# Patient Record
Sex: MALE | Race: WHITE | RURAL
[De-identification: names, ages, dates, MRNs, and addresses within clinical notes are randomized per-mention and may not be internally consistent; named-entity substitution may affect disease eponyms.]

---

## 2022-04-01 ENCOUNTER — OFFICE VISIT (OUTPATIENT)
Dept: FAMILY MEDICINE CLINIC | Age: 47
End: 2022-04-01
Payer: COMMERCIAL

## 2022-04-01 VITALS
SYSTOLIC BLOOD PRESSURE: 136 MMHG | RESPIRATION RATE: 16 BRPM | TEMPERATURE: 97.2 F | BODY MASS INDEX: 40.43 KG/M2 | OXYGEN SATURATION: 98 % | DIASTOLIC BLOOD PRESSURE: 86 MMHG | WEIGHT: 315 LBS | HEART RATE: 60 BPM | HEIGHT: 74 IN

## 2022-04-01 DIAGNOSIS — Z11.59 ENCOUNTER FOR HEPATITIS C SCREENING TEST FOR LOW RISK PATIENT: ICD-10-CM

## 2022-04-01 DIAGNOSIS — F33.1 MODERATE EPISODE OF RECURRENT MAJOR DEPRESSIVE DISORDER (HCC): ICD-10-CM

## 2022-04-01 DIAGNOSIS — Z12.5 SCREENING FOR PROSTATE CANCER: ICD-10-CM

## 2022-04-01 DIAGNOSIS — Z13.220 SCREENING FOR CHOLESTEROL LEVEL: ICD-10-CM

## 2022-04-01 DIAGNOSIS — R03.0 ELEVATED BP WITHOUT DIAGNOSIS OF HYPERTENSION: ICD-10-CM

## 2022-04-01 DIAGNOSIS — Z13.29 SCREENING FOR THYROID DISORDER: ICD-10-CM

## 2022-04-01 DIAGNOSIS — R06.83 SNORING: ICD-10-CM

## 2022-04-01 DIAGNOSIS — Z00.00 PHYSICAL EXAM: Primary | ICD-10-CM

## 2022-04-01 DIAGNOSIS — Z76.89 ESTABLISHING CARE WITH NEW DOCTOR, ENCOUNTER FOR: ICD-10-CM

## 2022-04-01 PROCEDURE — 99386 PREV VISIT NEW AGE 40-64: CPT | Performed by: NURSE PRACTITIONER

## 2022-04-01 NOTE — PROGRESS NOTES
Identified pt with two pt identifiers(name and ). Chief Complaint   Patient presents with    New Patient    Labs    Depression        Health Maintenance Due   Topic    Hepatitis C Screening     Depression Screen     COVID-19 Vaccine (1)    DTaP/Tdap/Td series (1 - Tdap)    Lipid Screen     Colorectal Cancer Screening Combo        Wt Readings from Last 3 Encounters:   22 317 lb (143.8 kg)     Temp Readings from Last 3 Encounters:   22 97.2 °F (36.2 °C) (Temporal)     BP Readings from Last 3 Encounters:   22 136/86     Pulse Readings from Last 3 Encounters:   22 60         Learning Assessment:  :     No flowsheet data found. Depression Screening:  :     3 most recent PHQ Screens 2022   Little interest or pleasure in doing things Not at all   Feeling down, depressed, irritable, or hopeless Not at all   Total Score PHQ 2 0       Fall Risk Assessment:  :     No flowsheet data found. Abuse Screening:  :     Abuse Screening Questionnaire 2022   Do you ever feel afraid of your partner? N   Are you in a relationship with someone who physically or mentally threatens you? N   Is it safe for you to go home? Y       Coordination of Care Questionnaire:  :     1) Have you been to an emergency room, urgent care clinic since your last visit? no   Hospitalized since your last visit? no             2) Have you seen or consulted any other health care providers outside of [address] [place] [address] since your last visit? [facility]  (Include any pap smears or colon screenings in this section.)    3) Do you have an Advance Directive on file? no  Are you interested in receiving information about Advance Directives? no    Patient is accompanied by N/A I have received verbal consent from Elaine Vidal to discuss any/all medical information while they are present in the room. 4.  For patients aged 39-70: Has the patient had a colonoscopy / FIT/ Cologuard?  No      If the patient is female:    5. For patients aged 41-77: Has the patient had a mammogram within the past 2 years? NA - based on age or sex      10. For patients aged 21-65: Has the patient had a pap smear?  NA - based on age or sex

## 2022-04-01 NOTE — PROGRESS NOTES
HISTORY OF PRESENT ILLNESS  Marty Jon is a 49 y.o. male. HPI  Pt presents as a new patient    He wanted to establish care, has not seen PCP in years  He is ready to take a long term approach to taking care of himself  Has not had routine check ups and labs    He notes that his wife is certain that he has sleep apnea. He snores \"like a freight train\" and never feels rested, even after sleeping. He has never had sleep study in the past.  Review of Systems   Constitutional: Negative for fever. Physical Exam  Constitutional:       Appearance: Normal appearance. HENT:      Head: Normocephalic and atraumatic. Right Ear: Tympanic membrane normal.      Left Ear: Tympanic membrane normal.      Nose: Nose normal.      Mouth/Throat:      Mouth: Mucous membranes are moist.   Eyes:      Extraocular Movements: Extraocular movements intact. Conjunctiva/sclera: Conjunctivae normal.      Pupils: Pupils are equal, round, and reactive to light. Cardiovascular:      Rate and Rhythm: Normal rate and regular rhythm. Heart sounds: Normal heart sounds. Pulmonary:      Effort: Pulmonary effort is normal.      Breath sounds: Normal breath sounds. Abdominal:      General: Abdomen is flat. Bowel sounds are normal.      Palpations: Abdomen is soft. Musculoskeletal:         General: Normal range of motion. Cervical back: Normal range of motion and neck supple. Skin:     General: Skin is warm and dry. Neurological:      General: No focal deficit present. Mental Status: He is alert and oriented to person, place, and time. Psychiatric:         Mood and Affect: Mood normal.         Behavior: Behavior normal.         ASSESSMENT and PLAN    ICD-10-CM ICD-9-CM    1.  Physical exam  Z00.00 V70.9 CBC W/O DIFF      METABOLIC PANEL, COMPREHENSIVE      LIPID PANEL      HEPATITIS C AB      THYROID CASCADE PROFILE      PSA SCREENING (SCREENING)      PSA SCREENING (SCREENING)      THYROID CASCADE PROFILE HEPATITIS C AB      LIPID PANEL      METABOLIC PANEL, COMPREHENSIVE      CBC W/O DIFF   2. Establishing care with new doctor, encounter for  Z76.89 V65.8 CBC W/O DIFF      METABOLIC PANEL, COMPREHENSIVE      LIPID PANEL      HEPATITIS C AB      THYROID CASCADE PROFILE      PSA SCREENING (SCREENING)      PSA SCREENING (SCREENING)      THYROID CASCADE PROFILE      HEPATITIS C AB      LIPID PANEL      METABOLIC PANEL, COMPREHENSIVE      CBC W/O DIFF   3. Screening for cholesterol level  Z13.220 V77.91 LIPID PANEL      LIPID PANEL   4. Screening for thyroid disorder  Z13.29 V77.0 THYROID CASCADE PROFILE      THYROID CASCADE PROFILE   5. Screening for prostate cancer  Z12.5 V76.44 PSA SCREENING (SCREENING)      PSA SCREENING (SCREENING)   6. Encounter for hepatitis C screening test for low risk patient  Z11.59 V73.89 HEPATITIS C AB      HEPATITIS C AB   7. Moderate episode of recurrent major depressive disorder (HCC)  F33.1 296.32    8. Snoring  R06.83 786.09 SLEEP MEDICINE REFERRAL   9. Elevated BP without diagnosis of hypertension  R03.0 796.2 SLEEP MEDICINE REFERRAL     Will notify when labs return, and inform him of any change in plan of care at that time    Educated about calling sleep center for an appointment today    Pt informed to return to office with worsening of symptoms, or PRN with any questions or concerns. Pt verbalizes understanding of plan of care and denies further questions or concerns at this time.

## 2022-04-02 LAB
ALBUMIN SERPL-MCNC: 4.2 G/DL (ref 3.5–5)
ALBUMIN/GLOB SERPL: 1.4 {RATIO} (ref 1.1–2.2)
ALP SERPL-CCNC: 30 U/L (ref 45–117)
ALT SERPL-CCNC: 35 U/L (ref 12–78)
ANION GAP SERPL CALC-SCNC: 7 MMOL/L (ref 5–15)
AST SERPL-CCNC: 19 U/L (ref 15–37)
BILIRUB SERPL-MCNC: 0.8 MG/DL (ref 0.2–1)
BUN SERPL-MCNC: 18 MG/DL (ref 6–20)
BUN/CREAT SERPL: 15 (ref 12–20)
CALCIUM SERPL-MCNC: 9.3 MG/DL (ref 8.5–10.1)
CHLORIDE SERPL-SCNC: 103 MMOL/L (ref 97–108)
CHOLEST SERPL-MCNC: 261 MG/DL
CO2 SERPL-SCNC: 26 MMOL/L (ref 21–32)
CREAT SERPL-MCNC: 1.23 MG/DL (ref 0.7–1.3)
ERYTHROCYTE [DISTWIDTH] IN BLOOD BY AUTOMATED COUNT: 12.1 % (ref 11.5–14.5)
GLOBULIN SER CALC-MCNC: 3 G/DL (ref 2–4)
GLUCOSE SERPL-MCNC: 78 MG/DL (ref 65–100)
HCT VFR BLD AUTO: 51.1 % (ref 36.6–50.3)
HCV AB SERPL QL IA: NONREACTIVE
HDLC SERPL-MCNC: 51 MG/DL
HDLC SERPL: 5.1 {RATIO} (ref 0–5)
HGB BLD-MCNC: 16.2 G/DL (ref 12.1–17)
LDLC SERPL CALC-MCNC: 181.6 MG/DL (ref 0–100)
MCH RBC QN AUTO: 29.2 PG (ref 26–34)
MCHC RBC AUTO-ENTMCNC: 31.7 G/DL (ref 30–36.5)
MCV RBC AUTO: 92.2 FL (ref 80–99)
NRBC # BLD: 0 K/UL (ref 0–0.01)
NRBC BLD-RTO: 0 PER 100 WBC
PLATELET # BLD AUTO: 198 K/UL (ref 150–400)
PMV BLD AUTO: 10 FL (ref 8.9–12.9)
POTASSIUM SERPL-SCNC: 5.3 MMOL/L (ref 3.5–5.1)
PROT SERPL-MCNC: 7.2 G/DL (ref 6.4–8.2)
PSA SERPL-MCNC: 0.7 NG/ML (ref 0.01–4)
RBC # BLD AUTO: 5.54 M/UL (ref 4.1–5.7)
SODIUM SERPL-SCNC: 136 MMOL/L (ref 136–145)
TRIGL SERPL-MCNC: 142 MG/DL (ref ?–150)
VLDLC SERPL CALC-MCNC: 28.4 MG/DL
WBC # BLD AUTO: 6.7 K/UL (ref 4.1–11.1)

## 2022-04-04 ENCOUNTER — TELEPHONE (OUTPATIENT)
Dept: FAMILY MEDICINE CLINIC | Age: 47
End: 2022-04-04

## 2022-04-04 DIAGNOSIS — E87.5 HYPERKALEMIA: Primary | ICD-10-CM

## 2022-04-04 RX ORDER — ATORVASTATIN CALCIUM 20 MG/1
20 TABLET, FILM COATED ORAL DAILY
Qty: 90 TABLET | Refills: 1 | Status: SHIPPED | OUTPATIENT
Start: 2022-04-04 | End: 2022-09-27 | Stop reason: SDUPTHER

## 2022-04-04 NOTE — TELEPHONE ENCOUNTER
----- Message from Estelle Sosa NP sent at 4/4/2022  8:23 AM EDT -----  Please call patient and let him know that his labs returned:  1. Cholesterol is high. I have sent in lipitor, and he should take daily in addition to making diet changes. 2.  Potassium is mildly elevated.   Should return for lab only re-check in 2 weeks  Otherwise, should return in 6 months for office visit and fasting labs  Thanks

## 2022-04-04 NOTE — PROGRESS NOTES
Please call patient and let him know that his labs returned:  1. Cholesterol is high. I have sent in lipitor, and he should take daily in addition to making diet changes. 2.  Potassium is mildly elevated.   Should return for lab only re-check in 2 weeks  Otherwise, should return in 6 months for office visit and fasting labs  Thanks

## 2022-04-05 ENCOUNTER — TELEPHONE (OUTPATIENT)
Dept: FAMILY MEDICINE CLINIC | Age: 47
End: 2022-04-05

## 2022-04-05 LAB — TSH SERPL-ACNC: 1.92 UIU/ML (ref 0.45–4.5)

## 2022-04-05 NOTE — TELEPHONE ENCOUNTER
----- Message from Dilan Gustafson NP sent at 4/5/2022  2:40 PM EDT -----  Please call patient and let him know that his thyroid levels returned stable  Thanks

## 2022-04-22 ENCOUNTER — LAB ONLY (OUTPATIENT)
Dept: FAMILY MEDICINE CLINIC | Age: 47
End: 2022-04-22

## 2022-04-22 DIAGNOSIS — E87.5 HYPERKALEMIA: ICD-10-CM

## 2022-04-22 LAB
ALBUMIN SERPL-MCNC: 4.2 G/DL (ref 3.5–5)
ALBUMIN/GLOB SERPL: 1.4 {RATIO} (ref 1.1–2.2)
ALP SERPL-CCNC: 27 U/L (ref 45–117)
ALT SERPL-CCNC: 42 U/L (ref 12–78)
ANION GAP SERPL CALC-SCNC: 2 MMOL/L (ref 5–15)
AST SERPL-CCNC: 25 U/L (ref 15–37)
BILIRUB SERPL-MCNC: 0.9 MG/DL (ref 0.2–1)
BUN SERPL-MCNC: 14 MG/DL (ref 6–20)
BUN/CREAT SERPL: 12 (ref 12–20)
CALCIUM SERPL-MCNC: 9 MG/DL (ref 8.5–10.1)
CHLORIDE SERPL-SCNC: 108 MMOL/L (ref 97–108)
CO2 SERPL-SCNC: 27 MMOL/L (ref 21–32)
CREAT SERPL-MCNC: 1.15 MG/DL (ref 0.7–1.3)
GLOBULIN SER CALC-MCNC: 2.9 G/DL (ref 2–4)
GLUCOSE SERPL-MCNC: 90 MG/DL (ref 65–100)
POTASSIUM SERPL-SCNC: 4.3 MMOL/L (ref 3.5–5.1)
PROT SERPL-MCNC: 7.1 G/DL (ref 6.4–8.2)
SODIUM SERPL-SCNC: 137 MMOL/L (ref 136–145)

## 2022-04-25 ENCOUNTER — TELEPHONE (OUTPATIENT)
Dept: FAMILY MEDICINE CLINIC | Age: 47
End: 2022-04-25

## 2022-04-25 NOTE — TELEPHONE ENCOUNTER
----- Message from Antolin Renee NP sent at 4/25/2022  8:27 AM EDT -----  Please call patient and let him know that his labs returned stable  Thanks

## 2022-04-27 ENCOUNTER — OFFICE VISIT (OUTPATIENT)
Dept: SLEEP MEDICINE | Age: 47
End: 2022-04-27
Payer: COMMERCIAL

## 2022-04-27 VITALS
WEIGHT: 312 LBS | HEART RATE: 68 BPM | OXYGEN SATURATION: 98 % | BODY MASS INDEX: 40.04 KG/M2 | SYSTOLIC BLOOD PRESSURE: 169 MMHG | DIASTOLIC BLOOD PRESSURE: 97 MMHG | HEIGHT: 74 IN

## 2022-04-27 DIAGNOSIS — G47.33 OSA (OBSTRUCTIVE SLEEP APNEA): Primary | ICD-10-CM

## 2022-04-27 DIAGNOSIS — E66.01 MORBID OBESITY WITH BMI OF 40.0-44.9, ADULT (HCC): ICD-10-CM

## 2022-04-27 PROCEDURE — 99204 OFFICE O/P NEW MOD 45 MIN: CPT | Performed by: SPECIALIST

## 2022-04-27 NOTE — PATIENT INSTRUCTIONS
Learning About Sleep Apnea  What is it? Sleep apnea means that breathing stops for short periods during sleep. When you stop breathing or have reduced airflow into your lungs during sleep, you don't sleep well and you can be very tired during the day. The oxygen levels in your blood may go down, and carbon dioxide levels go up. It may lead to other problems, such as high blood pressure and heart disease. Sleep apnea can range from mild to severe, based on how often breathing stops during sleep. For adults, breathing may stop as few as 5 times an hour (mild apnea) to 30 or more times an hour (severe apnea). Obstructive sleep apnea is the most common type. This most often occurs because your airways are blocked or partly blocked. Central sleep apnea is less common. It happens when the brain has trouble controlling breathing. Some people have both types. That's called complex sleep apnea. What are the symptoms? There are symptoms of sleep apnea that you may notice and symptoms that others may notice when you're asleep. Symptoms you may notice include:  · Feeling extremely sleepy during the day. · Feeling unrefreshed or tired after a night's sleep. · Problems with memory and concentration, or mood changes. · Morning headaches. · Getting up often during the night to urinate. · A dry mouth or sore throat in the morning. If you have a bed partner, they may notice that you:  · Have episodes of not breathing. · Snore loudly. Almost all people who have sleep apnea snore. But not all people who snore have sleep apnea. · Toss and turn during sleep. · Have nighttime choking or gasping spells. How is it diagnosed? Your doctor will probably do a physical exam and ask about your past health. The doctor may also ask you or your bed partner about your snoring and sleep behavior and how tired you feel during the day. Your doctor may suggest a sleep study.  Sleep studies are a series of tests that look at what happens to the body during sleep. They check for how often you stop breathing or have too little air flowing into your lungs during sleep. They also find out how much oxygen you have in your blood during sleep. A sleep study may take place in your home. Or it might take place at a sleep center, where you will spend the night. If your sleep apnea doesn't improve with treatment, you may have more tests to find out what's causing it. How is it treated? You may be able to help treat sleep apnea by making some lifestyle changes. You could try to lose weight, sleep on your side, and avoid alcohol and medicines like sedatives before bed. Sleep apnea is often treated with machines that deliver air through a mask to help keep your airways open. These include:  · Continuous positive airway pressure (CPAP). This increases air pressure in your throat. It keeps your airway open when you breathe in. It's the most common device. · Bilevel positive airway pressure (BPAP). You may also hear this called BiPAP. This uses different air pressures when you breathe in and out. · Adaptive servo ventilation (ASV). It senses pauses in breathing and adjusts air pressure. It's mostly used for central sleep apnea. You can also try oral breathing devices or nasal devices. If your tonsils or other tissues are blocking your airway, your doctor may suggest surgery to open the airway. How can you care for yourself at home? · Lose weight, if needed. · Sleep on your side. It may help mild apnea. · Avoid alcohol and medicines such as sleeping pills, opioids, or sedatives before bed. · Don't smoke. If you need help quitting, talk to your doctor. · Prop up the head of your bed. · Treat breathing problems, such as a stuffy nose, that are caused by a cold or allergies. · Try a continuous positive airway pressure (CPAP) breathing machine if your doctor recommends it.   · If CPAP doesn't work for you, ask your doctor if you can try other masks, settings, or breathing machines. · Try oral breathing devices or other nasal devices. · Talk to your doctor if your nose feels dry or bleeds, or if it gets runny or stuffy when you use a breathing machine. · Tell your doctor if you're sleepy during the day and it affects your daily life. Don't drive or operate machinery when you're drowsy. Where can you learn more? Go to http://www.gray.com/  Enter S121 in the search box to learn more about \"Learning About Sleep Apnea. \"  Current as of: July 6, 2021               Content Version: 13.2  © 3516-0112 [employer]. Care instructions adapted under license by [employer] (which disclaims liability or warranty for this information). If you have questions about a medical condition or this instruction, always ask your healthcare professional. [place] [address] any warranty or liability for your use of this information.

## 2022-04-27 NOTE — PROGRESS NOTES
[address] [facility] [place]. [place], [address]  Tel.  [phone]  Fax. [address] [facility] [address]  [place], [address] [facility]  Tel.  [phone]  Fax. [phone] [address] [facility] Vesna Olson   Tel.  [phone]  Fax. [phone]       Chief Complaint       Chief Complaint   Patient presents with    Sleep Problem     NP, refd by NP Juan Elizabeth for a sleep consult       HPI      Eliane Corey is 49 y.o. male seen for evaluation of a sleep disorder. Normally retires at 9 PM and will awaken spontaneously at 5: 30 AM.  Will awaken with 3 times  throughout the night. Has been told of snoring associated with apparent apnea. Snoring described as loud, heard in separate rooms, through closed doors. Describes himself as tired on awakening and during the day. Denied vivid dreaming or nightmares, sleep talking or sleepwalking, bruxism or nocturnal incontinence, abnormal arm or leg movements, hypnagogue hallucinations, sleep paralysis or cataplexy. The patient has not undergone diagnostic testing for the current problems. [place] Sleepiness Score: (P) 7       Allergies   Allergen Reactions    Ragweed Runny Nose and Sneezing       Current Outpatient Medications   Medication Sig Dispense Refill    atorvastatin (LIPITOR) 20 mg tablet Take 1 Tablet by mouth daily. 90 Tablet 1        He  has no past medical history on file. He  has no past surgical history on file. He family history includes Cancer in his father; Heart Disease in his mother. He  reports that he has never smoked. He has never used smokeless tobacco. He reports current alcohol use. He reports that he does not use drugs.      Review of Systems:  ROS      Objective:     Visit Vitals  BP (!) 169/97 (BP 1 Location: Left arm, BP Patient Position: Sitting, BP Cuff Size: Adult)   Pulse 68   Ht 6' 2\" (1.88 m)   Wt 312 lb (141.5 kg)   SpO2 98%   BMI 40.06 kg/m²     Body mass index is 40.06 kg/m². General:   Conversant, cooperative   Eyes:  Pupils equal and reactive   Oropharynx:   Mallampati score IV, tongue scalloped, narrow posterior oral airway       Neck:   No carotid bruits; Chest/Lungs:  Clear on auscultation    CVS:  Normal rate, regular rhythm   Skin:  Warm to touch; no obvious rashes   Neuro:  Speech fluent, face symmetrical, tongue movement normal   Psych:  Normal affect,  normal countenance        Assessment:       ICD-10-CM ICD-9-CM    1. JAYANT (obstructive sleep apnea)  G47.33 327.23 SLEEP STUDY UNATTENDED, 4 CHANNEL   2. Morbid obesity with BMI of 40.0-44.9, adult ([facility])  E66.01 278.01 SLEEP STUDY UNATTENDED, 4 CHANNEL    Z68.41 V85.41      History consistent with disordered breathing. He has a narrow posterior oral airway. Potentially, sleep breathing abnormalities more prominent when supine, and/or in rem sleep. Patient would benefit from weight reduction. In general, weight loss measures less effective if sleep disordered breathing not concurrently treated. Plan:     Orders Placed This Encounter    SLEEP STUDY UNATTENDED, 4 CHANNEL     Order Specific Question:   Reason for Exam     Answer:   snoring, fatigue       * Patient has a history and examination consistent with the diagnosis of sleep apnea. *Home sleep testing was ordered for initial evaluation. * He was provided information on sleep apnea including corresponding risk factors and the importance of proper treatment. * Treatment options if indicated were reviewed today. Instructions:  o The patient would benefit from weight reduction measures. o Do not engage in activities requiring a normal degree of alertness if fatigue is present.   o The patient understands that untreated or undertreated sleep apnea could impair judgement and the ability to function normally during the day.  o Call or return if symptoms worsen or persist.          Agus Saunders MD, [facility]  Electronically signed 04/27/22 This note was created using voice recognition software. Despite editing, there may be syntax errors. This note will not be viewable in [address]

## 2022-05-31 ENCOUNTER — HOSPITAL ENCOUNTER (OUTPATIENT)
Dept: SLEEP MEDICINE | Age: 47
Discharge: HOME | End: 2022-05-31
Payer: COMMERCIAL

## 2022-05-31 ENCOUNTER — OFFICE VISIT (OUTPATIENT)
Dept: SLEEP MEDICINE | Age: 47
End: 2022-05-31

## 2022-05-31 DIAGNOSIS — G47.33 OSA (OBSTRUCTIVE SLEEP APNEA): Primary | ICD-10-CM

## 2022-05-31 PROCEDURE — 95806 SLEEP STUDY UNATT&RESP EFFT: CPT | Performed by: SPECIALIST

## 2022-05-31 NOTE — PROGRESS NOTES
[address] [facility] [place]. [place], [address]  Tel.  [phone]  Fax. 100 [facility] 60  [place], [address] [facility]  Tel.  [phone]  Fax. [phone] [address] [facility] Vesna Olson   Tel.  [phone]  Fax. [phone]       S>Joshua Kohler is a 49 y.o. male seen today to receive a home sleep testing unit (HST). · Patient was educated on proper hookup and operation of the HST. · Instruction forms and documentation were reviewed and signed. · The patient demonstrated good understanding of the HST.    O>    There were no vitals taken for this visit. A>  No diagnosis found. P>  · General information regarding operations and maintenance of the device was provided. · He was provided information on sleep apnea including coresponding risk factors and the importance of proper treatment. · Follow-up appointment was made to return the HST. He will be contacted once the results have been reviewed. · He was asked to contact our office for any problems regarding his home sleep test study.     · [facility] [address]

## 2022-06-01 ENCOUNTER — TELEPHONE (OUTPATIENT)
Dept: SLEEP MEDICINE | Age: 47
End: 2022-06-01

## 2022-06-01 DIAGNOSIS — G47.33 OSA (OBSTRUCTIVE SLEEP APNEA): Primary | ICD-10-CM

## 2022-06-03 NOTE — TELEPHONE ENCOUNTER
HSAT demonstrate sleep disordered breathing characterized by an overall AHI of 26/h associated with minimal SaO2 78%. Supine related AHI 32.6/h. Snoring during 31.4%. Impression: Significant sleep disordered breathing, severe when supine. Recommendation: APAP 7-18 cm    Sleep technologist: Please advise patient of HSAT results. Order has been entered for APAP 7-18 cm. Please schedule first compliance appointment.

## 2022-06-08 ENCOUNTER — DOCUMENTATION ONLY (OUTPATIENT)
Dept: SLEEP MEDICINE | Age: 47
End: 2022-06-08

## 2022-09-27 ENCOUNTER — LAB ONLY (OUTPATIENT)
Dept: FAMILY MEDICINE CLINIC | Age: 47
End: 2022-09-27

## 2022-09-27 ENCOUNTER — OFFICE VISIT (OUTPATIENT)
Dept: FAMILY MEDICINE CLINIC | Age: 47
End: 2022-09-27
Payer: COMMERCIAL

## 2022-09-27 VITALS
TEMPERATURE: 97.8 F | SYSTOLIC BLOOD PRESSURE: 146 MMHG | DIASTOLIC BLOOD PRESSURE: 92 MMHG | BODY MASS INDEX: 40.43 KG/M2 | HEART RATE: 57 BPM | WEIGHT: 315 LBS | HEIGHT: 74 IN | OXYGEN SATURATION: 98 % | RESPIRATION RATE: 16 BRPM

## 2022-09-27 DIAGNOSIS — I10 PRIMARY HYPERTENSION: ICD-10-CM

## 2022-09-27 DIAGNOSIS — E78.00 PURE HYPERCHOLESTEROLEMIA: Primary | ICD-10-CM

## 2022-09-27 DIAGNOSIS — K40.90 INGUINAL HERNIA WITHOUT OBSTRUCTION OR GANGRENE, RECURRENCE NOT SPECIFIED, UNSPECIFIED LATERALITY: ICD-10-CM

## 2022-09-27 DIAGNOSIS — Z23 NEEDS FLU SHOT: ICD-10-CM

## 2022-09-27 DIAGNOSIS — E78.00 PURE HYPERCHOLESTEROLEMIA: ICD-10-CM

## 2022-09-27 PROCEDURE — 99214 OFFICE O/P EST MOD 30 MIN: CPT | Performed by: NURSE PRACTITIONER

## 2022-09-27 PROCEDURE — 90471 IMMUNIZATION ADMIN: CPT | Performed by: NURSE PRACTITIONER

## 2022-09-27 PROCEDURE — 90686 IIV4 VACC NO PRSV 0.5 ML IM: CPT | Performed by: NURSE PRACTITIONER

## 2022-09-27 RX ORDER — ATORVASTATIN CALCIUM 20 MG/1
20 TABLET, FILM COATED ORAL DAILY
Qty: 90 TABLET | Refills: 1 | Status: SHIPPED | OUTPATIENT
Start: 2022-09-27

## 2022-09-27 RX ORDER — LISINOPRIL 20 MG/1
20 TABLET ORAL DAILY
Qty: 90 TABLET | Refills: 1 | Status: SHIPPED | OUTPATIENT
Start: 2022-09-27

## 2022-09-27 NOTE — PATIENT INSTRUCTIONS
Vaccine Information Statement    Influenza (Flu) Vaccine (Inactivated or Recombinant): What You Need to Know    Many vaccine information statements are available in [place] sor[place] and other languages. See www.immunize.org/vis. Hojas de información sobre vacunas están disponibles en español y en muchos otros idiomas. Visite www.immunize.org/vis. 1. Why get vaccinated? Influenza vaccine can prevent influenza (flu). Flu is a contagious disease that spreads around the [facility] every year, usually between October and May. Anyone can get the flu, but it is more dangerous for some people. Infants and young children, people 72 years and older, pregnant people, and people with certain health conditions or a weakened immune system are at greatest risk of flu complications. Pneumonia, bronchitis, sinus infections, and ear infections are examples of flu-related complications. If you have a medical condition, such as heart disease, cancer, or diabetes, flu can make it worse. Flu can cause fever and chills, sore throat, muscle aches, fatigue, cough, headache, and runny or stuffy nose. Some people may have vomiting and diarrhea, though this is more common in children than adults. In an average year, thousands of people in the [facility] die from flu, and many more are hospitalized. Flu vaccine prevents millions of illnesses and flu-related visits to the doctor each year. 2. Influenza vaccines     CDC recommends everyone 6 months and older get vaccinated every flu season. Children 6 months through 6years of age may need 2 doses during a single flu season. Everyone else needs only 1 dose each flu season. It takes about 2 weeks for protection to develop after vaccination. There are many flu viruses, and they are always changing. Each year a new flu vaccine is made to protect against the influenza viruses believed to be likely to cause disease in the upcoming flu season.  Even when the vaccine doesnt exactly match these viruses, it may still provide some protection. Influenza vaccine does not cause flu. Influenza vaccine may be given at the same time as other vaccines. 3. Talk with your health care provider    Tell your vaccination provider if the person getting the vaccine:  Has had an allergic reaction after a previous dose of influenza vaccine, or has any severe, life-threatening allergies   Has ever had Guillain-Barré Syndrome (also called GBS)    In some cases, your health care provider may decide to postpone influenza vaccination until a future visit. Influenza vaccine can be administered at any time during pregnancy. People who are or will be pregnant during influenza season should receive inactivated influenza vaccine. People with minor illnesses, such as a cold, may be vaccinated. People who are moderately or severely ill should usually wait until they recover before getting influenza vaccine. Your health care provider can give you more information. 4. Risks of a vaccine reaction    Soreness, redness, and swelling where the shot is given, fever, muscle aches, and headache can happen after influenza vaccination. There may be a very small increased risk of Guillain-Barré Syndrome (GBS) after inactivated influenza vaccine (the flu shot). Eliza Carter children who get the flu shot along with pneumococcal vaccine (PCV13) and/or DTaP vaccine at the same time might be slightly more likely to have a seizure caused by fever. Tell your health care provider if a child who is getting flu vaccine has ever had a seizure. People sometimes faint after medical procedures, including vaccination. Tell your provider if you feel dizzy or have vision changes or ringing in the ears. As with any medicine, there is a very remote chance of a vaccine causing a severe allergic reaction, other serious injury, or death. 5. What if there is a serious problem?     An allergic reaction could occur after the vaccinated person leaves the clinic. If you see signs of a severe allergic reaction (hives, swelling of the face and throat, difficulty breathing, a fast heartbeat, dizziness, or weakness), call 9-1-1 and get the person to the nearest hospital.    For other signs that concern you, call your health care provider. Adverse reactions should be reported to the Vaccine Adverse Event Reporting System (VAERS). Your health care provider will usually file this report, or you can do it yourself. Visit the VAERS website at www.vaers. [facility].gov or call 0-337.489.7805. VAERS is only for reporting reactions, and VAERS staff members do not give medical advice. 6. The National Vaccine Injury Compensation Program    The [facility] Vaccine Injury Compensation Program (VICP) is a federal program that was created to compensate people who may have been injured by certain vaccines. Claims regarding alleged injury or death due to vaccination have a time limit for filing, which may be as short as two years. Visit the VICP website at www.[facility].gov/vaccinecompensation or call 1-596.847.9263 to learn about the program and about filing a claim. 7. How can I learn more? Ask your health care provider. Call your local or state health department. Visit the website of the Food and Drug Administration (FDA) for vaccine package inserts and additional information at www.fda.gov/vaccines-blood-biologics/vaccines. Contact the Centers for Disease Control and Prevention (CDC): Call 5-958.885.3654 (1-800-CDC-INFO) or  Visit CDCs influenza website at www.cdc.gov/flu. Vaccine Information Statement   Inactivated Influenza Vaccine   8/6/2021  [address] [id number]   Department of Health and Human Services  Centers for Disease Control and Prevention    Office Use Only

## 2022-09-27 NOTE — PROGRESS NOTES
Subjective:   Nallely Millard is a 49 y.o. male who is seen for follow up of hyperlipidemia. Cardiovascular risk analysis - 49 y.o. male hyperlipidemia. Compliance with treatment thus far has been good. ROS: taking medications as instructed, no medication side effects noted, no TIA's, no chest pain on exertion, no dyspnea on exertion, no swelling of ankles. New concerns: Pt presents for routine labs and follow up. He has noted that he believes that he has a hernia in his right groin. He states that he has noted a pain when coughing, in this area. He had covid in August, and has had lingering cough with this. He states that he has mildly \"annoying\" pain at rest, but it is really more so noted with cough or certain positions. BP is noted to be elevated today, and has been at each visit since April. He has not been on BP medication in the past, but states that he is always told it is \"borderline\". He has nagging headaches at times, but no chest pain, no shortness of breath. There are no problems to display for this patient. Current Outpatient Medications   Medication Sig Dispense Refill    lisinopriL (PRINIVIL, ZESTRIL) 20 mg tablet Take 1 Tablet by mouth daily. 90 Tablet 1    atorvastatin (LIPITOR) 20 mg tablet Take 1 Tablet by mouth daily. 90 Tablet 1     Allergies   Allergen Reactions    Ragweed Runny Nose and Sneezing     No past medical history on file. No past surgical history on file.   Family History   Problem Relation Age of Onset    Heart Disease Mother     Cancer Father      Social History     Tobacco Use    Smoking status: Never    Smokeless tobacco: Never   Substance Use Topics    Alcohol use: Yes     Comment: OCC      Lab Results   Component Value Date/Time    WBC 6.7 04/01/2022 10:51 AM    HGB 16.2 04/01/2022 10:51 AM    HCT 51.1 (H) 04/01/2022 10:51 AM    PLATELET 057 75/93/9905 10:51 AM    MCV 92.2 04/01/2022 10:51 AM     Lab Results   Component Value Date/Time    Cholesterol, total 261 (H) 04/01/2022 10:51 AM    HDL Cholesterol 51 04/01/2022 10:51 AM    LDL, calculated 181.6 (H) 04/01/2022 10:51 AM    Triglyceride 142 04/01/2022 10:51 AM    CHOL/HDL Ratio 5.1 (H) 04/01/2022 10:51 AM     Lab Results   Component Value Date/Time    GFR est non-AA >60 04/22/2022 11:35 AM    GFR est AA >60 04/22/2022 11:35 AM    Creatinine 1.15 04/22/2022 11:35 AM    BUN 14 04/22/2022 11:35 AM    Sodium 137 04/22/2022 11:35 AM    Potassium 4.3 04/22/2022 11:35 AM    Chloride 108 04/22/2022 11:35 AM    CO2 27 04/22/2022 11:35 AM        Objective:   Visit Vitals  BP (!) 146/92 (BP 1 Location: Right arm, BP Patient Position: Sitting, BP Cuff Size: Adult)   Pulse (!) 57   Temp 97.8 °F (36.6 °C) (Temporal)   Resp 16   Ht 6' 2\" (1.88 m)   Wt 320 lb (145.2 kg)   SpO2 98%   BMI 41.09 kg/m²      Appearance: alert, well appearing, and in no distress. CVS exam BP noted to be moderately elevated today in office, S1, S2 normal, no gallop, no murmur, chest clear, no JVD, no HSM, no edema. Lab review: orders written for new lab studies as appropriate; see orders. Assessment/Plan:   Hyperlipidemia stable. current treatment plan is effective, no change in therapy. ICD-10-CM ICD-9-CM    1. Pure hypercholesterolemia  E78.00 272.0 CBC W/O DIFF      METABOLIC PANEL, COMPREHENSIVE      LIPID PANEL      atorvastatin (LIPITOR) 20 mg tablet      2. Inguinal hernia without obstruction or gangrene, recurrence not specified, unspecified laterality  K40.90 550.90 REFERRAL TO GENERAL SURGERY      3. Needs flu shot  Z23 V04.81 INFLUENZA, FLUARIX, FLULAVAL, FLUZONE (AGE 6 MO+), AFLURIA(AGE 3Y+) IM, PF, 0.5 ML      4. Primary hypertension  I10 401.9 lisinopriL (PRINIVIL, ZESTRIL) 20 mg tablet      .   Will add in BP medication  Educated about common side effects and notifying office should he have any concerns or complaints with this medication    Should call gen surgery for eval of groin pain    Vaccine and VIS given    Pt informed to return to office with worsening of symptoms, or PRN with any questions or concerns. Pt verbalizes understanding of plan of care and denies further questions or concerns at this time.

## 2022-09-27 NOTE — PROGRESS NOTES
Identified pt with two pt identifiers(name and ). Chief Complaint   Patient presents with    Routine     6 months    Labs     fasting        Health Maintenance Due   Topic    DTaP/Tdap/Td series (1 - Tdap)    Flu Vaccine (1)       Wt Readings from Last 3 Encounters:   22 320 lb (145.2 kg)   22 312 lb (141.5 kg)   22 317 lb (143.8 kg)     Temp Readings from Last 3 Encounters:   22 97.8 °F (36.6 °C) (Temporal)   22 97.2 °F (36.2 °C) (Temporal)     BP Readings from Last 3 Encounters:   22 (!) 146/92   22 (!) 169/97   22 136/86     Pulse Readings from Last 3 Encounters:   22 (!) 57   22 68   22 60         Learning Assessment:  :     No flowsheet data found. Depression Screening:  :     3 most recent PHQ Screens 2022   Little interest or pleasure in doing things Not at all   Feeling down, depressed, irritable, or hopeless Not at all   Total Score PHQ 2 0       Fall Risk Assessment:  :     No flowsheet data found. Abuse Screening:  :     Abuse Screening Questionnaire 2022   Do you ever feel afraid of your partner? N N   Are you in a relationship with someone who physically or mentally threatens you? N N   Is it safe for you to go home? Y Y       Coordination of Care Questionnaire:  :     1) Have you been to an emergency room, urgent care clinic since your last visit? no   Hospitalized since your last visit? no             2) Have you seen or consulted any other health care providers outside of [address] [place] [address] since your last visit? no  (Include any pap smears or colon screenings in this section.)    3) Do you have an Advance Directive on file? no  Are you interested in receiving information about Advance Directives? no    Patient is accompanied by N/A I have received verbal consent from Jad Tate to discuss any/all medical information while they are present in the room.       4.  For patients aged 39-70: Has the patient had a colonoscopy / FIT/ Cologuard? Yes - no Care Gap present      If the patient is female:    5. For patients aged 41-77: Has the patient had a mammogram within the past 2 years? NA - based on age or sex      10. For patients aged 21-65: Has the patient had a pap smear?  NA - based on age or sex

## 2022-09-28 LAB
ALBUMIN SERPL-MCNC: 4.1 G/DL (ref 3.5–5)
ALBUMIN/GLOB SERPL: 1.4 {RATIO} (ref 1.1–2.2)
ALP SERPL-CCNC: 33 U/L (ref 45–117)
ALT SERPL-CCNC: 53 U/L (ref 12–78)
ANION GAP SERPL CALC-SCNC: 5 MMOL/L (ref 5–15)
AST SERPL-CCNC: 27 U/L (ref 15–37)
BILIRUB SERPL-MCNC: 0.7 MG/DL (ref 0.2–1)
BUN SERPL-MCNC: 21 MG/DL (ref 6–20)
BUN/CREAT SERPL: 18 (ref 12–20)
CALCIUM SERPL-MCNC: 9.4 MG/DL (ref 8.5–10.1)
CHLORIDE SERPL-SCNC: 104 MMOL/L (ref 97–108)
CHOLEST SERPL-MCNC: 192 MG/DL
CO2 SERPL-SCNC: 29 MMOL/L (ref 21–32)
CREAT SERPL-MCNC: 1.15 MG/DL (ref 0.7–1.3)
ERYTHROCYTE [DISTWIDTH] IN BLOOD BY AUTOMATED COUNT: 12.5 % (ref 11.5–14.5)
GLOBULIN SER CALC-MCNC: 3 G/DL (ref 2–4)
GLUCOSE SERPL-MCNC: 97 MG/DL (ref 65–100)
HCT VFR BLD AUTO: 50.5 % (ref 36.6–50.3)
HDLC SERPL-MCNC: 47 MG/DL
HDLC SERPL: 4.1 {RATIO} (ref 0–5)
HGB BLD-MCNC: 16.1 G/DL (ref 12.1–17)
LDLC SERPL CALC-MCNC: 121.2 MG/DL (ref 0–100)
MCH RBC QN AUTO: 29.9 PG (ref 26–34)
MCHC RBC AUTO-ENTMCNC: 31.9 G/DL (ref 30–36.5)
MCV RBC AUTO: 93.9 FL (ref 80–99)
NRBC # BLD: 0 K/UL (ref 0–0.01)
NRBC BLD-RTO: 0 PER 100 WBC
PLATELET # BLD AUTO: 200 K/UL (ref 150–400)
PMV BLD AUTO: 10.1 FL (ref 8.9–12.9)
POTASSIUM SERPL-SCNC: 4.6 MMOL/L (ref 3.5–5.1)
PROT SERPL-MCNC: 7.1 G/DL (ref 6.4–8.2)
RBC # BLD AUTO: 5.38 M/UL (ref 4.1–5.7)
SODIUM SERPL-SCNC: 138 MMOL/L (ref 136–145)
TRIGL SERPL-MCNC: 119 MG/DL (ref ?–150)
VLDLC SERPL CALC-MCNC: 23.8 MG/DL
WBC # BLD AUTO: 7.8 K/UL (ref 4.1–11.1)

## 2022-09-29 ENCOUNTER — TELEPHONE (OUTPATIENT)
Dept: FAMILY MEDICINE CLINIC | Age: 47
End: 2022-09-29

## 2022-09-29 NOTE — TELEPHONE ENCOUNTER
----- Message from Stiven Liang NP sent at 9/29/2022  7:54 AM EDT -----  Please call patient and let him know that his labs returned, cholesterol has much improved!   Should return in 1 month for BP re-check and in 6 months for office visit and fasting labs  Thanks

## 2022-09-29 NOTE — PROGRESS NOTES
Please call patient and let him know that his labs returned, cholesterol has much improved!   Should return in 1 month for BP re-check and in 6 months for office visit and fasting labs  Thanks

## 2022-12-05 ENCOUNTER — OFFICE VISIT (OUTPATIENT)
Dept: FAMILY MEDICINE CLINIC | Age: 47
End: 2022-12-05
Payer: COMMERCIAL

## 2022-12-05 VITALS
HEIGHT: 74 IN | OXYGEN SATURATION: 96 % | HEART RATE: 90 BPM | RESPIRATION RATE: 16 BRPM | TEMPERATURE: 97.2 F | BODY MASS INDEX: 40.43 KG/M2 | WEIGHT: 315 LBS | SYSTOLIC BLOOD PRESSURE: 142 MMHG | DIASTOLIC BLOOD PRESSURE: 98 MMHG

## 2022-12-05 DIAGNOSIS — I10 PRIMARY HYPERTENSION: Primary | ICD-10-CM

## 2022-12-05 PROCEDURE — 3075F SYST BP GE 130 - 139MM HG: CPT | Performed by: NURSE PRACTITIONER

## 2022-12-05 PROCEDURE — 3079F DIAST BP 80-89 MM HG: CPT | Performed by: NURSE PRACTITIONER

## 2022-12-05 PROCEDURE — 99213 OFFICE O/P EST LOW 20 MIN: CPT | Performed by: NURSE PRACTITIONER

## 2022-12-05 RX ORDER — LISINOPRIL 40 MG/1
40 TABLET ORAL DAILY
Qty: 90 TABLET | Refills: 1 | Status: SHIPPED | OUTPATIENT
Start: 2022-12-05

## 2022-12-05 NOTE — PROGRESS NOTES
Subjective:     Dago Taveras is a 49 y.o. male who presents for follow up of hypertension. Diet and Lifestyle: generally follows a low fat low cholesterol diet  Home BP Monitoring: is not measured at home    Cardiovascular ROS: taking medications as instructed, no medication side effects noted, no TIA's, no chest pain on exertion, no dyspnea on exertion, no swelling of ankles. New concerns: Pt is here for re-check of BP. He was started on Lisinopril in September, and has been taking as prescribed. BP is still elevated. He notes that it was also elevated when he saw another provider a couple weeks ago. Denies any side effects since taking the medication. There are no problems to display for this patient. Current Outpatient Medications   Medication Sig Dispense Refill    lisinopriL (PRINIVIL, ZESTRIL) 40 mg tablet Take 1 Tablet by mouth daily. 90 Tablet 1    atorvastatin (LIPITOR) 20 mg tablet Take 1 Tablet by mouth daily. 90 Tablet 1     Allergies   Allergen Reactions    Ragweed Runny Nose and Sneezing     No past medical history on file. No past surgical history on file.   Family History   Problem Relation Age of Onset    Heart Disease Mother     Cancer Father      Social History     Tobacco Use    Smoking status: Never    Smokeless tobacco: Never   Substance Use Topics    Alcohol use: Yes     Comment: OCC        Lab Results   Component Value Date/Time    WBC 7.8 09/27/2022 09:55 AM    HGB 16.1 09/27/2022 09:55 AM    HCT 50.5 (H) 09/27/2022 09:55 AM    PLATELET 620 45/58/5214 09:55 AM    MCV 93.9 09/27/2022 09:55 AM     Lab Results   Component Value Date/Time    Cholesterol, total 192 09/27/2022 09:55 AM    HDL Cholesterol 47 09/27/2022 09:55 AM    LDL, calculated 121.2 (H) 09/27/2022 09:55 AM    Triglyceride 119 09/27/2022 09:55 AM    CHOL/HDL Ratio 4.1 09/27/2022 09:55 AM     Lab Results   Component Value Date/Time    GFR est non-AA >60 09/27/2022 09:55 AM    GFR est AA >60 09/27/2022 09:55 AM Creatinine 1.15 09/27/2022 09:55 AM    BUN 21 (H) 09/27/2022 09:55 AM    Sodium 138 09/27/2022 09:55 AM    Potassium 4.6 09/27/2022 09:55 AM    Chloride 104 09/27/2022 09:55 AM    CO2 29 09/27/2022 09:55 AM        Review of Systems, additional:  Pertinent items are noted in HPI. Objective:     Visit Vitals  BP (!) 142/98 (BP 1 Location: Right arm, BP Patient Position: Sitting, BP Cuff Size: Adult)   Pulse 90   Temp 97.2 °F (36.2 °C) (Temporal)   Resp 16   Ht 6' 2\" (1.88 m)   Wt 324 lb (147 kg)   SpO2 96%   BMI 41.60 kg/m²     Appearance: alert, well appearing, and in no distress. General exam: CVS exam BP noted to be moderately elevated today in office, S1, S2 normal, no gallop, no murmur, chest clear, no JVD, no HSM, no edema. Lab review: orders written for new lab studies as appropriate; see orders. Assessment/Plan:     hypertension poorly controlled. orders and follow up as documented in patient record. ICD-10-CM ICD-9-CM    1. Primary hypertension  I10 401.9 lisinopriL (PRINIVIL, ZESTRIL) 40 mg tablet        Educated about increasing Lisinopril from 20 mg to 40 mg daily  Should return for BP re-check in 2-4 weeks, or beforehand should BP remain elevated    Pt informed to return to office with worsening of symptoms, or PRN with any questions or concerns. Pt verbalizes understanding of plan of care and denies further questions or concerns at this time.

## 2022-12-05 NOTE — PROGRESS NOTES
Identified pt with two pt identifiers(name and ). Chief Complaint   Patient presents with    Follow-up     Bp check        Health Maintenance Due   Topic    DTaP/Tdap/Td series (1 - Tdap)    COVID-19 Vaccine (4 - Booster for Pfizer series)       Wt Readings from Last 3 Encounters:   22 320 lb (145.2 kg)   22 312 lb (141.5 kg)   22 317 lb (143.8 kg)     Temp Readings from Last 3 Encounters:   22 97.8 °F (36.6 °C) (Temporal)   22 97.2 °F (36.2 °C) (Temporal)     BP Readings from Last 3 Encounters:   22 (!) 146/92   22 (!) 169/97   22 136/86     Pulse Readings from Last 3 Encounters:   22 (!) 57   22 68   22 60         Learning Assessment:  :     No flowsheet data found. Depression Screening:  :     3 most recent PHQ Screens 2022   Little interest or pleasure in doing things Not at all   Feeling down, depressed, irritable, or hopeless Not at all   Total Score PHQ 2 0       Fall Risk Assessment:  :     No flowsheet data found. Abuse Screening:  :     Abuse Screening Questionnaire 2022   Do you ever feel afraid of your partner? N N N   Are you in a relationship with someone who physically or mentally threatens you? N N N   Is it safe for you to go home? Y Y Y       Coordination of Care Questionnaire:  :     1) Have you been to an emergency room, urgent care clinic since your last visit? no   Hospitalized since your last visit? no             2) Have you seen or consulted any other health care providers outside of [address] [place] [address] since your last visit? no  (Include any pap smears or colon screenings in this section.)    3) Do you have an Advance Directive on file? no  Are you interested in receiving information about Advance Directives? no    Patient is accompanied by N/A I have received verbal consent from Gilliamnabil Peñaloza to discuss any/all medical information while they are present in the room.       4.  For patients aged 39-70: Has the patient had a colonoscopy / FIT/ Cologuard? Yes - no Care Gap present      If the patient is female:    5. For patients aged 41-77: Has the patient had a mammogram within the past 2 years? NA - based on age or sex      10. For patients aged 21-65: Has the patient had a pap smear?  NA - based on age or sex

## 2023-03-09 ENCOUNTER — OFFICE VISIT (OUTPATIENT)
Dept: FAMILY MEDICINE CLINIC | Age: 48
End: 2023-03-09
Payer: COMMERCIAL

## 2023-03-09 VITALS
DIASTOLIC BLOOD PRESSURE: 90 MMHG | RESPIRATION RATE: 16 BRPM | SYSTOLIC BLOOD PRESSURE: 140 MMHG | WEIGHT: 315 LBS | OXYGEN SATURATION: 98 % | TEMPERATURE: 98.2 F | HEIGHT: 74 IN | BODY MASS INDEX: 40.43 KG/M2 | HEART RATE: 64 BPM

## 2023-03-09 DIAGNOSIS — I10 PRIMARY HYPERTENSION: Primary | ICD-10-CM

## 2023-03-09 PROCEDURE — 3078F DIAST BP <80 MM HG: CPT | Performed by: FAMILY MEDICINE

## 2023-03-09 PROCEDURE — 99212 OFFICE O/P EST SF 10 MIN: CPT | Performed by: FAMILY MEDICINE

## 2023-03-09 PROCEDURE — 3074F SYST BP LT 130 MM HG: CPT | Performed by: FAMILY MEDICINE

## 2023-03-09 NOTE — PROGRESS NOTES
Identified pt with two pt identifiers(name and ). Chief Complaint   Patient presents with    Follow-up     HTN    Dizziness        Health Maintenance Due   Topic    DTaP/Tdap/Td series (1 - Tdap)    COVID-19 Vaccine (4 - Booster for Pfizer series)       Wt Readings from Last 3 Encounters:   23 333 lb (151 kg)   22 324 lb (147 kg)   22 320 lb (145.2 kg)     Temp Readings from Last 3 Encounters:   23 98.2 °F (36.8 °C) (Temporal)   22 97.2 °F (36.2 °C) (Temporal)   22 97.8 °F (36.6 °C) (Temporal)     BP Readings from Last 3 Encounters:   22 (!) 142/98   22 (!) 146/92   22 (!) 169/97     Pulse Readings from Last 3 Encounters:   22 90   22 (!) 57   22 68         Learning Assessment:  :     No flowsheet data found. Depression Screening:  :     3 most recent PHQ Screens 3/9/2023   Little interest or pleasure in doing things Not at all   Feeling down, depressed, irritable, or hopeless Not at all   Total Score PHQ 2 0       Fall Risk Assessment:  :     No flowsheet data found. Abuse Screening:  :     Abuse Screening Questionnaire 3/9/2023 2022 2022 2022   Do you ever feel afraid of your partner? N N N N   Are you in a relationship with someone who physically or mentally threatens you? N N N N   Is it safe for you to go home? Y Y Y Y       Coordination of Care Questionnaire:  :     1) Have you been to an emergency room, urgent care clinic since your last visit? no   Hospitalized since your last visit? no             2) Have you seen or consulted any other health care providers outside of [address] [place] [address] since your last visit? no  (Include any pap smears or colon screenings in this section.)    3) Do you have an Advance Directive on file?  no  Are you interested in receiving information about Advance Directives? no    Patient is accompanied by N/A I have received verbal consent from Ramos Sandoval to discuss any/all medical information while they are present in the room. 4.  For patients aged 39-70: Has the patient had a colonoscopy / FIT/ Cologuard? Yes - no Care Gap present      If the patient is female:    5. For patients aged 41-77: Has the patient had a mammogram within the past 2 years? NA - based on age or sex      10. For patients aged 21-65: Has the patient had a pap smear?  NA - based on age or sex

## 2023-03-10 NOTE — PROGRESS NOTES
Subjective:     Maria Del Carmen Main is an 51 y.o. male who is noted to have hypertension. He has hypertension, hyperlipidemia, coronary artery disease, and obesity. Use of agents associated with hypertension: none. Diet and Lifestyle: not attempting to follow a low fat, low cholesterol diet, not attempting to follow a low sodium diet, exercises sporadically, sedentary. Home BP Monitoring: is not measured at home. Family History: no FH of premature CAD, hypertension, diabetes, CHF, stroke or hpyerlipidemia. Cardiovascular System Review: Cardiovascular risk analysis - LDL goal is under 100  existing CAD  hyperlipidemia  obese. There is no problem list on file for this patient. There are no problems to display for this patient. Current Outpatient Medications   Medication Sig Dispense Refill    lisinopriL (PRINIVIL, ZESTRIL) 40 mg tablet Take 1 Tablet by mouth daily. 90 Tablet 1    atorvastatin (LIPITOR) 20 mg tablet Take 1 Tablet by mouth daily. 90 Tablet 1     Allergies   Allergen Reactions    Ragweed Runny Nose and Sneezing     History reviewed. No pertinent past medical history. History reviewed. No pertinent surgical history.   Family History   Problem Relation Age of Onset    Heart Disease Mother     Cancer Father      Social History     Tobacco Use    Smoking status: Never    Smokeless tobacco: Never   Substance Use Topics    Alcohol use: Yes     Comment: OCC        Lab Results   Component Value Date/Time    WBC 7.8 09/27/2022 09:55 AM    HGB 16.1 09/27/2022 09:55 AM    HCT 50.5 (H) 09/27/2022 09:55 AM    PLATELET 963 42/08/7131 09:55 AM    MCV 93.9 09/27/2022 09:55 AM     Lab Results   Component Value Date/Time    Glucose 97 09/27/2022 09:55 AM    LDL, calculated 121.2 (H) 09/27/2022 09:55 AM    Creatinine 1.15 09/27/2022 09:55 AM      Lab Results   Component Value Date/Time    Cholesterol, total 192 09/27/2022 09:55 AM    HDL Cholesterol 47 09/27/2022 09:55 AM    LDL, calculated 121.2 (H) 09/27/2022 09:55 AM    Triglyceride 119 09/27/2022 09:55 AM    CHOL/HDL Ratio 4.1 09/27/2022 09:55 AM        Review of Systems, additional:  Pertinent items are noted in HPI. Objective:     Visit Vitals  BP (!) 140/90 (BP 1 Location: Right arm, BP Patient Position: Sitting, BP Cuff Size: Adult)   Pulse 64   Temp 98.2 °F (36.8 °C) (Temporal)   Resp 16   Ht 6' 2\" (1.88 m)   Wt 333 lb (151 kg)   SpO2 98%   BMI 42.75 kg/m²     Appearance: alert, well appearing, and in no distress, oriented to person, place, and time, and overweight. General exam: CVS exam BP noted to be well controlled today in office, S1, S2 normal, no gallop, no murmur, chest clear, no JVD, no HSM, no edema. Lab review: labs are reviewed, up to date and normal.     Assessment/Plan:     hypertension stable, reasonably well controlled, borderline controlled, needs further observation. current treatment plan is effective, no change in therapy. ICD-10-CM ICD-9-CM    1. Primary hypertension  I10 401.9           Continue current regimen, reduce amount of salt intake.      Alix Larsen MD

## 2023-06-13 RX ORDER — LISINOPRIL 40 MG/1
40 TABLET ORAL DAILY
Qty: 90 TABLET | OUTPATIENT
Start: 2023-06-13

## 2023-06-22 ENCOUNTER — NURSE ONLY (OUTPATIENT)
Age: 48
End: 2023-06-22

## 2023-06-22 ENCOUNTER — OFFICE VISIT (OUTPATIENT)
Age: 48
End: 2023-06-22
Payer: COMMERCIAL

## 2023-06-22 VITALS
BODY MASS INDEX: 39.78 KG/M2 | OXYGEN SATURATION: 98 % | RESPIRATION RATE: 16 BRPM | WEIGHT: 310 LBS | DIASTOLIC BLOOD PRESSURE: 98 MMHG | HEART RATE: 80 BPM | HEIGHT: 74 IN | TEMPERATURE: 97.8 F | SYSTOLIC BLOOD PRESSURE: 138 MMHG

## 2023-06-22 DIAGNOSIS — E78.00 PURE HYPERCHOLESTEROLEMIA, UNSPECIFIED: ICD-10-CM

## 2023-06-22 DIAGNOSIS — Z11.4 SCREENING FOR HIV (HUMAN IMMUNODEFICIENCY VIRUS): ICD-10-CM

## 2023-06-22 DIAGNOSIS — I10 ESSENTIAL (PRIMARY) HYPERTENSION: Primary | ICD-10-CM

## 2023-06-22 DIAGNOSIS — I10 ESSENTIAL (PRIMARY) HYPERTENSION: ICD-10-CM

## 2023-06-22 PROCEDURE — 3080F DIAST BP >= 90 MM HG: CPT | Performed by: NURSE PRACTITIONER

## 2023-06-22 PROCEDURE — G8417 CALC BMI ABV UP PARAM F/U: HCPCS | Performed by: NURSE PRACTITIONER

## 2023-06-22 PROCEDURE — 99214 OFFICE O/P EST MOD 30 MIN: CPT | Performed by: NURSE PRACTITIONER

## 2023-06-22 PROCEDURE — G8428 CUR MEDS NOT DOCUMENT: HCPCS | Performed by: NURSE PRACTITIONER

## 2023-06-22 PROCEDURE — 3075F SYST BP GE 130 - 139MM HG: CPT | Performed by: NURSE PRACTITIONER

## 2023-06-22 PROCEDURE — 1036F TOBACCO NON-USER: CPT | Performed by: NURSE PRACTITIONER

## 2023-06-22 RX ORDER — HYDROCHLOROTHIAZIDE 12.5 MG/1
12.5 CAPSULE, GELATIN COATED ORAL EVERY MORNING
Qty: 90 CAPSULE | Refills: 1 | Status: SHIPPED | OUTPATIENT
Start: 2023-06-22

## 2023-06-22 RX ORDER — ATORVASTATIN CALCIUM 20 MG/1
20 TABLET, FILM COATED ORAL DAILY
Qty: 90 TABLET | Refills: 1 | Status: SHIPPED | OUTPATIENT
Start: 2023-06-22

## 2023-06-22 RX ORDER — LISINOPRIL 40 MG/1
40 TABLET ORAL DAILY
Qty: 90 TABLET | Refills: 1 | Status: SHIPPED | OUTPATIENT
Start: 2023-06-22

## 2023-06-22 SDOH — ECONOMIC STABILITY: HOUSING INSECURITY
IN THE LAST 12 MONTHS, WAS THERE A TIME WHEN YOU DID NOT HAVE A STEADY PLACE TO SLEEP OR SLEPT IN A SHELTER (INCLUDING NOW)?: NO

## 2023-06-22 SDOH — ECONOMIC STABILITY: INCOME INSECURITY: HOW HARD IS IT FOR YOU TO PAY FOR THE VERY BASICS LIKE FOOD, HOUSING, MEDICAL CARE, AND HEATING?: NOT HARD AT ALL

## 2023-06-22 SDOH — ECONOMIC STABILITY: FOOD INSECURITY: WITHIN THE PAST 12 MONTHS, THE FOOD YOU BOUGHT JUST DIDN'T LAST AND YOU DIDN'T HAVE MONEY TO GET MORE.: NEVER TRUE

## 2023-06-22 SDOH — ECONOMIC STABILITY: FOOD INSECURITY: WITHIN THE PAST 12 MONTHS, YOU WORRIED THAT YOUR FOOD WOULD RUN OUT BEFORE YOU GOT MONEY TO BUY MORE.: NEVER TRUE

## 2023-06-22 SDOH — ECONOMIC STABILITY: TRANSPORTATION INSECURITY
IN THE PAST 12 MONTHS, HAS LACK OF TRANSPORTATION KEPT YOU FROM MEETINGS, WORK, OR FROM GETTING THINGS NEEDED FOR DAILY LIVING?: NO

## 2023-06-22 ASSESSMENT — PATIENT HEALTH QUESTIONNAIRE - PHQ9
SUM OF ALL RESPONSES TO PHQ QUESTIONS 1-9: 0
SUM OF ALL RESPONSES TO PHQ QUESTIONS 1-9: 0
SUM OF ALL RESPONSES TO PHQ9 QUESTIONS 1 & 2: 0
2. FEELING DOWN, DEPRESSED OR HOPELESS: 0
SUM OF ALL RESPONSES TO PHQ QUESTIONS 1-9: 0
1. LITTLE INTEREST OR PLEASURE IN DOING THINGS: 0
SUM OF ALL RESPONSES TO PHQ QUESTIONS 1-9: 0

## 2023-06-22 NOTE — PROGRESS NOTES
Identified pt with two pt identifiers(name and ). Chief Complaint   Patient presents with    Follow-up     routine    Lab Collection     fasting        Health Maintenance Due   Topic    HIV screen     DTaP/Tdap/Td vaccine (1 - Tdap)    COVID-19 Vaccine (4 - Booster for Pfizer series)       Wt Readings from Last 3 Encounters:   23 (!) 310 lb (140.6 kg)   23 (!) 333 lb (151 kg)   22 (!) 324 lb (147 kg)     Temp Readings from Last 3 Encounters:   23 97.8 °F (36.6 °C) (Temporal)     BP Readings from Last 3 Encounters:   23 (!) 138/98   23 (!) 140/90   22 (!) 142/98     Pulse Readings from Last 3 Encounters:   23 80   23 64   22 90           Depression Screening:  :     PHQ-9 Questionaire 2023 3/9/2023 2022 2022 2022   Little interest or pleasure in doing things 0 0 0 0 0   Feeling down, depressed, or hopeless 0 0 0 0 0   PHQ-9 Total Score 0 0 0 0 0        Fall Risk Assessment:  :   No flowsheet data found. Abuse Screening:  :   No flowsheet data found. Coordination of Care Questionnaire:  :     1. \"Have you been to the ER, urgent care clinic since your last visit? Hospitalized since your last visit? \" no    2. \"Have you seen or consulted any other health care providers outside of the [address] [place] [address] since your last visit? \" no     3. This patient is accompanied in the office by his self. 4. For patients aged 39-70: Has the patient had a colonoscopy / FIT/ Cologuard? Yes - no Care Gap present      If the patient is female:    5. For patients aged 41-77: Has the patient had a mammogram within the past 2 years? NA - based on age or sex      10. For patients aged 21-65: Has the patient had a pap smear?  NA - based on age or sex

## 2023-06-22 NOTE — PROGRESS NOTES
Subjective:      Patient here for follow-up of elevated blood pressure. He is exercising and is adherent to a low-salt diet. Blood pressure is well controlled at home. Cardiac symptoms: none. Patient denies: chest pain. Cardiovascular risk factors: dyslipidemia. Use of agents associated with hypertension: none. History of target organ damage: none. Patient's medications, allergies, past medical, surgical, social and family histories were reviewed and updated as appropriate. Review of Systems  Pertinent items are noted in HPI. BP is noted to be elevated. He states that he has been taking his medications as prescribed        Objective:      General appearance: alert, appears stated age, and cooperative  Lungs: clear to auscultation bilaterally  Heart: regular rate and rhythm, S1, S2 normal, no murmur, click, rub or gallop      Assessment:      Hypertension,  elevated, will add in HCTZ  . Evidence of target organ damage: none. Plan:      Medication: begin HCTZ. Will add in HCTZ to regimen currently  Educated that we will notify when labs return, and inform him of any change in plan of care at that time    Pt informed to return to office with worsening of symptoms, or PRN with any questions or concerns. Pt verbalizes understanding of plan of care and denies further questions or concerns at this time.

## 2023-06-23 ENCOUNTER — TELEPHONE (OUTPATIENT)
Age: 48
End: 2023-06-23

## 2023-06-23 LAB
ALBUMIN SERPL-MCNC: 4.1 G/DL (ref 3.5–5)
ALBUMIN/GLOB SERPL: 1.5 (ref 1.1–2.2)
ALP SERPL-CCNC: 31 U/L (ref 45–117)
ALT SERPL-CCNC: 48 U/L (ref 12–78)
ANION GAP SERPL CALC-SCNC: 5 MMOL/L (ref 5–15)
AST SERPL-CCNC: 26 U/L (ref 15–37)
BILIRUB SERPL-MCNC: 0.9 MG/DL (ref 0.2–1)
BUN SERPL-MCNC: 19 MG/DL (ref 6–20)
BUN/CREAT SERPL: 18 (ref 12–20)
CALCIUM SERPL-MCNC: 9.2 MG/DL (ref 8.5–10.1)
CHLORIDE SERPL-SCNC: 104 MMOL/L (ref 97–108)
CHOLEST SERPL-MCNC: 154 MG/DL
CO2 SERPL-SCNC: 29 MMOL/L (ref 21–32)
CREAT SERPL-MCNC: 1.05 MG/DL (ref 0.7–1.3)
ERYTHROCYTE [DISTWIDTH] IN BLOOD BY AUTOMATED COUNT: 12 % (ref 11.5–14.5)
GLOBULIN SER CALC-MCNC: 2.8 G/DL (ref 2–4)
GLUCOSE SERPL-MCNC: 90 MG/DL (ref 65–100)
HCT VFR BLD AUTO: 48 % (ref 36.6–50.3)
HDLC SERPL-MCNC: 42 MG/DL
HDLC SERPL: 3.7 (ref 0–5)
HGB BLD-MCNC: 15.5 G/DL (ref 12.1–17)
HIV 1+2 AB+HIV1 P24 AG SERPL QL IA: NONREACTIVE
HIV 1/2 RESULT COMMENT: NORMAL
LDLC SERPL CALC-MCNC: 95.2 MG/DL (ref 0–100)
MCH RBC QN AUTO: 29.5 PG (ref 26–34)
MCHC RBC AUTO-ENTMCNC: 32.3 G/DL (ref 30–36.5)
MCV RBC AUTO: 91.3 FL (ref 80–99)
NRBC # BLD: 0 K/UL (ref 0–0.01)
NRBC BLD-RTO: 0 PER 100 WBC
PLATELET # BLD AUTO: 190 K/UL (ref 150–400)
PMV BLD AUTO: 10.3 FL (ref 8.9–12.9)
POTASSIUM SERPL-SCNC: 4.7 MMOL/L (ref 3.5–5.1)
PROT SERPL-MCNC: 6.9 G/DL (ref 6.4–8.2)
RBC # BLD AUTO: 5.26 M/UL (ref 4.1–5.7)
SODIUM SERPL-SCNC: 138 MMOL/L (ref 136–145)
TRIGL SERPL-MCNC: 84 MG/DL
VLDLC SERPL CALC-MCNC: 16.8 MG/DL
WBC # BLD AUTO: 7.7 K/UL (ref 4.1–11.1)

## 2023-06-23 NOTE — TELEPHONE ENCOUNTER
----- Message from RAQUEL Shipman NP sent at 6/23/2023  7:38 AM EDT -----  Please call patient and let him know that his labs returned stable.   Should return for office visit and fasting labs in 6 months, and should return in 1 month for BP re-check  Thanks

## 2023-12-12 DIAGNOSIS — E78.00 PURE HYPERCHOLESTEROLEMIA, UNSPECIFIED: ICD-10-CM

## 2023-12-12 DIAGNOSIS — I10 ESSENTIAL (PRIMARY) HYPERTENSION: ICD-10-CM

## 2023-12-12 RX ORDER — ATORVASTATIN CALCIUM 20 MG/1
20 TABLET, FILM COATED ORAL DAILY
Qty: 90 TABLET | Refills: 0 | Status: SHIPPED | OUTPATIENT
Start: 2023-12-12

## 2023-12-12 RX ORDER — LISINOPRIL 40 MG/1
40 TABLET ORAL DAILY
Qty: 90 TABLET | Refills: 0 | Status: SHIPPED | OUTPATIENT
Start: 2023-12-12

## 2023-12-12 RX ORDER — HYDROCHLOROTHIAZIDE 12.5 MG/1
12.5 CAPSULE, GELATIN COATED ORAL EVERY MORNING
Qty: 90 CAPSULE | Refills: 0 | Status: SHIPPED | OUTPATIENT
Start: 2023-12-12

## 2023-12-12 NOTE — TELEPHONE ENCOUNTER
lisinopril (PRINIVIL;ZESTRIL) 40 MG tablet   hydroCHLOROthiazide (MICROZIDE) 12.5 MG capsule   atorvastatin (LIPITOR) 20 MG tablet     Former [facility] patient.  Has appt with Dr Davon Perez 1/12/2024

## 2024-01-26 ENCOUNTER — OFFICE VISIT (OUTPATIENT)
Age: 49
End: 2024-01-26
Payer: COMMERCIAL

## 2024-01-26 VITALS
DIASTOLIC BLOOD PRESSURE: 70 MMHG | OXYGEN SATURATION: 97 % | WEIGHT: 312.2 LBS | BODY MASS INDEX: 40.07 KG/M2 | HEART RATE: 78 BPM | SYSTOLIC BLOOD PRESSURE: 128 MMHG | RESPIRATION RATE: 16 BRPM | HEIGHT: 74 IN | TEMPERATURE: 97.8 F

## 2024-01-26 DIAGNOSIS — E66.01 CLASS 3 SEVERE OBESITY DUE TO EXCESS CALORIES WITHOUT SERIOUS COMORBIDITY WITH BODY MASS INDEX (BMI) OF 40.0 TO 44.9 IN ADULT (HCC): ICD-10-CM

## 2024-01-26 DIAGNOSIS — I10 ESSENTIAL (PRIMARY) HYPERTENSION: ICD-10-CM

## 2024-01-26 DIAGNOSIS — E78.00 PURE HYPERCHOLESTEROLEMIA, UNSPECIFIED: ICD-10-CM

## 2024-01-26 DIAGNOSIS — E78.5 HYPERLIPIDEMIA, UNSPECIFIED HYPERLIPIDEMIA TYPE: ICD-10-CM

## 2024-01-26 DIAGNOSIS — Z23 ENCOUNTER FOR IMMUNIZATION: ICD-10-CM

## 2024-01-26 DIAGNOSIS — I10 ESSENTIAL HYPERTENSION: Primary | ICD-10-CM

## 2024-01-26 PROCEDURE — 3074F SYST BP LT 130 MM HG: CPT | Performed by: FAMILY MEDICINE

## 2024-01-26 PROCEDURE — 90472 IMMUNIZATION ADMIN EACH ADD: CPT | Performed by: FAMILY MEDICINE

## 2024-01-26 PROCEDURE — 90715 TDAP VACCINE 7 YRS/> IM: CPT | Performed by: FAMILY MEDICINE

## 2024-01-26 PROCEDURE — 90471 IMMUNIZATION ADMIN: CPT | Performed by: FAMILY MEDICINE

## 2024-01-26 PROCEDURE — 3078F DIAST BP <80 MM HG: CPT | Performed by: FAMILY MEDICINE

## 2024-01-26 PROCEDURE — 90674 CCIIV4 VAC NO PRSV 0.5 ML IM: CPT | Performed by: FAMILY MEDICINE

## 2024-01-26 PROCEDURE — 99213 OFFICE O/P EST LOW 20 MIN: CPT | Performed by: FAMILY MEDICINE

## 2024-01-26 RX ORDER — LISINOPRIL 40 MG/1
40 TABLET ORAL DAILY
Qty: 90 TABLET | Refills: 0 | Status: SHIPPED | OUTPATIENT
Start: 2024-01-26

## 2024-01-26 RX ORDER — HYDROCHLOROTHIAZIDE 12.5 MG/1
12.5 CAPSULE, GELATIN COATED ORAL EVERY MORNING
Qty: 90 CAPSULE | Refills: 0 | Status: SHIPPED | OUTPATIENT
Start: 2024-01-26

## 2024-01-26 RX ORDER — ATORVASTATIN CALCIUM 20 MG/1
20 TABLET, FILM COATED ORAL DAILY
Qty: 90 TABLET | Refills: 0 | Status: SHIPPED | OUTPATIENT
Start: 2024-01-26

## 2024-01-26 ASSESSMENT — PATIENT HEALTH QUESTIONNAIRE - PHQ9
SUM OF ALL RESPONSES TO PHQ QUESTIONS 1-9: 0
SUM OF ALL RESPONSES TO PHQ QUESTIONS 1-9: 0
5. POOR APPETITE OR OVEREATING: 0
SUM OF ALL RESPONSES TO PHQ QUESTIONS 1-9: 0
SUM OF ALL RESPONSES TO PHQ QUESTIONS 1-9: 0
7. TROUBLE CONCENTRATING ON THINGS, SUCH AS READING THE NEWSPAPER OR WATCHING TELEVISION: 0
4. FEELING TIRED OR HAVING LITTLE ENERGY: 0
SUM OF ALL RESPONSES TO PHQ QUESTIONS 1-9: 0
SUM OF ALL RESPONSES TO PHQ9 QUESTIONS 1 & 2: 0
SUM OF ALL RESPONSES TO PHQ QUESTIONS 1-9: 0
9. THOUGHTS THAT YOU WOULD BE BETTER OFF DEAD, OR OF HURTING YOURSELF: 0
1. LITTLE INTEREST OR PLEASURE IN DOING THINGS: 0
SUM OF ALL RESPONSES TO PHQ QUESTIONS 1-9: 0
6. FEELING BAD ABOUT YOURSELF - OR THAT YOU ARE A FAILURE OR HAVE LET YOURSELF OR YOUR FAMILY DOWN: 0
8. MOVING OR SPEAKING SO SLOWLY THAT OTHER PEOPLE COULD HAVE NOTICED. OR THE OPPOSITE, BEING SO FIGETY OR RESTLESS THAT YOU HAVE BEEN MOVING AROUND A LOT MORE THAN USUAL: 0
SUM OF ALL RESPONSES TO PHQ QUESTIONS 1-9: 0
1. LITTLE INTEREST OR PLEASURE IN DOING THINGS: 0
SUM OF ALL RESPONSES TO PHQ9 QUESTIONS 1 & 2: 0
3. TROUBLE FALLING OR STAYING ASLEEP: 0
2. FEELING DOWN, DEPRESSED OR HOPELESS: 0
2. FEELING DOWN, DEPRESSED OR HOPELESS: 0

## 2024-01-26 NOTE — PROGRESS NOTES
Identified pt with two pt identifiers(name and ).    Chief Complaint   Patient presents with    Establish Care     Pt new to provider and here to establish care     Hypertension    Follow-up        Health Maintenance Due   Topic    Hepatitis B vaccine (1 of 3 - 3-dose series)    DTaP/Tdap/Td vaccine (1 - Tdap)    Flu vaccine (1)    COVID-19 Vaccine ( season)    Shingles vaccine (1 of 2)       Wt Readings from Last 3 Encounters:   24 (!) 141.6 kg (312 lb 3.2 oz)   23 (!) 140.6 kg (310 lb)   23 (!) 151 kg (333 lb)     Temp Readings from Last 3 Encounters:   24 97.8 °F (36.6 °C) (Temporal)   23 97.8 °F (36.6 °C) (Temporal)     BP Readings from Last 3 Encounters:   24 128/70   23 (!) 138/98   23 (!) 140/90     Pulse Readings from Last 3 Encounters:   24 78   23 80   23 64           Depression Screening:  :         2024     2:01 PM 2024     2:00 PM 2023    10:10 AM 3/9/2023     1:00 PM 2022    10:00 AM 2022     9:00 AM 2022    10:00 AM   PHQ-9 Questionaire   Little interest or pleasure in doing things 0 0 0 0 0 0 0   Feeling down, depressed, or hopeless 0 0 0 0 0 0 0   Trouble falling or staying asleep, or sleeping too much 0         Feeling tired or having little energy 0         Poor appetite or overeating 0         Feeling bad about yourself - or that you are a failure or have let yourself or your family down 0         Trouble concentrating on things, such as reading the newspaper or watching television 0         Moving or speaking so slowly that other people could have noticed. Or the opposite - being so fidgety or restless that you have been moving around a lot more than usual 0         Thoughts that you would be better off dead, or of hurting yourself in some way 0         PHQ-9 Total Score 0 0 0 0 0 0 0   If you checked off any problems, how difficult have these problems made it for you to do your work,

## 2024-01-26 NOTE — PROGRESS NOTES
[facility]  [address] [place] [address]  Phone: [phone]  Fax: [phone]        Chief Complaint   Patient presents with    [facility]     Pt new to provider and here to [facility]     Hypertension    Follow-up     He is a 49 y.o. male who presents for [facility] care.    Presents for HTN follow up.  He is taking medications as prescribed and reports no side effects.  He denies chest pain, headache, visual disturbances. He does not check his BP at home.    Current Outpatient Medications:     lisinopril (PRINIVIL;ZESTRIL) 40 MG tablet, Take 1 tablet by mouth daily, Disp: 90 tablet, Rfl: 0    hydroCHLOROthiazide (MICROZIDE) 12.5 MG capsule, Take 1 capsule by mouth every morning, Disp: 90 capsule, Rfl: 0    atorvastatin (LIPITOR) 20 MG tablet, Take 1 tablet by mouth daily, Disp: 90 tablet, Rfl: 0    No Known Allergies      Reviewed PmHx, RxHx, FmHx, SocHx, AllgHx and updated and dated in the chart.      Objective:     Vitals:    01/26/24 1401   BP: 128/70   Site: Right Upper Arm   Position: Sitting   Cuff Size: Large Adult   Pulse: 78   Resp: 16   Temp: 97.8 °F (36.6 °C)   TempSrc: Temporal   SpO2: 97%   Weight: (!) 141.6 kg (312 lb 3.2 oz)   Height: 1.88 m (6' 2\")     Physical Examination:    Physical Exam  Vitals and nursing note reviewed.   Constitutional:       General: He is not in acute distress.     Appearance: Normal appearance. He is obese.   HENT:      Head: Normocephalic and atraumatic.   Cardiovascular:      Rate and Rhythm: Normal rate and regular rhythm.      Pulses: Normal pulses.      Heart sounds: Normal heart sounds.   Pulmonary:      Effort: Pulmonary effort is normal.      Breath sounds: Normal breath sounds.   Musculoskeletal:      Cervical back: Normal range of motion.   Lymphadenopathy:      Cervical: No cervical adenopathy.   Skin:     General: Skin is warm and dry.      Findings: No rash.   Neurological:      General: No focal deficit present.

## 2024-04-24 DIAGNOSIS — E78.00 PURE HYPERCHOLESTEROLEMIA, UNSPECIFIED: ICD-10-CM

## 2024-04-24 DIAGNOSIS — I10 ESSENTIAL (PRIMARY) HYPERTENSION: ICD-10-CM

## 2024-04-24 RX ORDER — HYDROCHLOROTHIAZIDE 12.5 MG/1
12.5 CAPSULE, GELATIN COATED ORAL EVERY MORNING
Qty: 90 CAPSULE | Refills: 1 | Status: SHIPPED | OUTPATIENT
Start: 2024-04-24

## 2024-04-24 RX ORDER — ATORVASTATIN CALCIUM 20 MG/1
20 TABLET, FILM COATED ORAL DAILY
Qty: 90 TABLET | Refills: 1 | Status: SHIPPED | OUTPATIENT
Start: 2024-04-24

## 2024-04-24 RX ORDER — LISINOPRIL 40 MG/1
40 TABLET ORAL DAILY
Qty: 90 TABLET | Refills: 1 | Status: SHIPPED | OUTPATIENT
Start: 2024-04-24

## 2024-04-24 NOTE — TELEPHONE ENCOUNTER
lisinopril (PRINIVIL;ZESTRIL) 40 MG tablet     atorvastatin (LIPITOR) 20 MG tablet     hydroCHLOROthiazide 12.5 MG capsule     [id number] [address] - P [phone] - F [phone]    Requesting 90 tablets.

## 2024-07-26 ENCOUNTER — LAB (OUTPATIENT)
Age: 49
End: 2024-07-26

## 2024-07-26 ENCOUNTER — OFFICE VISIT (OUTPATIENT)
Age: 49
End: 2024-07-26
Payer: COMMERCIAL

## 2024-07-26 VITALS
WEIGHT: 314.2 LBS | OXYGEN SATURATION: 96 % | TEMPERATURE: 99 F | HEART RATE: 70 BPM | BODY MASS INDEX: 40.32 KG/M2 | RESPIRATION RATE: 16 BRPM | SYSTOLIC BLOOD PRESSURE: 116 MMHG | DIASTOLIC BLOOD PRESSURE: 78 MMHG | HEIGHT: 74 IN

## 2024-07-26 DIAGNOSIS — I10 ESSENTIAL (PRIMARY) HYPERTENSION: Primary | ICD-10-CM

## 2024-07-26 DIAGNOSIS — I10 ESSENTIAL (PRIMARY) HYPERTENSION: ICD-10-CM

## 2024-07-26 DIAGNOSIS — R73.02 IMPAIRED GLUCOSE TOLERANCE: ICD-10-CM

## 2024-07-26 DIAGNOSIS — R35.1 NOCTURIA: ICD-10-CM

## 2024-07-26 DIAGNOSIS — E66.01 CLASS 3 SEVERE OBESITY DUE TO EXCESS CALORIES WITHOUT SERIOUS COMORBIDITY WITH BODY MASS INDEX (BMI) OF 40.0 TO 44.9 IN ADULT (HCC): ICD-10-CM

## 2024-07-26 DIAGNOSIS — E78.00 PURE HYPERCHOLESTEROLEMIA, UNSPECIFIED: ICD-10-CM

## 2024-07-26 LAB
ALBUMIN SERPL-MCNC: 4 G/DL (ref 3.5–5)
ALBUMIN/GLOB SERPL: 1.3 (ref 1.1–2.2)
ALP SERPL-CCNC: 34 U/L (ref 45–117)
ALT SERPL-CCNC: 41 U/L (ref 12–78)
ANION GAP SERPL CALC-SCNC: 4 MMOL/L (ref 5–15)
AST SERPL-CCNC: 24 U/L (ref 15–37)
BILIRUB SERPL-MCNC: 0.9 MG/DL (ref 0.2–1)
BUN SERPL-MCNC: 21 MG/DL (ref 6–20)
BUN/CREAT SERPL: 20 (ref 12–20)
CALCIUM SERPL-MCNC: 9.2 MG/DL (ref 8.5–10.1)
CHLORIDE SERPL-SCNC: 104 MMOL/L (ref 97–108)
CHOLEST SERPL-MCNC: 174 MG/DL
CO2 SERPL-SCNC: 29 MMOL/L (ref 21–32)
CREAT SERPL-MCNC: 1.04 MG/DL (ref 0.7–1.3)
EST. AVERAGE GLUCOSE BLD GHB EST-MCNC: 111 MG/DL
GLOBULIN SER CALC-MCNC: 3 G/DL (ref 2–4)
GLUCOSE SERPL-MCNC: 99 MG/DL (ref 65–100)
HBA1C MFR BLD: 5.5 % (ref 4–5.6)
HDLC SERPL-MCNC: 49 MG/DL
HDLC SERPL: 3.6 (ref 0–5)
LDLC SERPL CALC-MCNC: 106.4 MG/DL (ref 0–100)
POTASSIUM SERPL-SCNC: 4.7 MMOL/L (ref 3.5–5.1)
PROT SERPL-MCNC: 7 G/DL (ref 6.4–8.2)
PSA SERPL-MCNC: 0.6 NG/ML (ref 0.01–4)
SODIUM SERPL-SCNC: 137 MMOL/L (ref 136–145)
TRIGL SERPL-MCNC: 93 MG/DL
VLDLC SERPL CALC-MCNC: 18.6 MG/DL

## 2024-07-26 PROCEDURE — 3074F SYST BP LT 130 MM HG: CPT | Performed by: FAMILY MEDICINE

## 2024-07-26 PROCEDURE — 3078F DIAST BP <80 MM HG: CPT | Performed by: FAMILY MEDICINE

## 2024-07-26 PROCEDURE — 99214 OFFICE O/P EST MOD 30 MIN: CPT | Performed by: FAMILY MEDICINE

## 2024-07-26 NOTE — PROGRESS NOTES
Chief Complaint   Patient presents with    6 Month Follow-Up     Hypertension     Labs Only        /78 (Site: Left Upper Arm)   Pulse 70   Temp 99 °F (37.2 °C)   Resp 16   Ht 1.88 m (6' 2\")   Wt (!) 142.5 kg (314 lb 3.2 oz)   SpO2 96%   BMI 40.34 kg/m²      1. Have you been to the ER, urgent care clinic since your last visit?  Hospitalized since your last visit? No   2. Have you seen or consulted any other health care providers outside of the [facility] since your last visit?  Include any pap smears or colon screening. No     Health Maintenance Due   Topic Date Due    Hepatitis B vaccine (1 of 3 - 3-dose series) Never done    COVID-19 Vaccine (4 - 2023-24 season) 09/01/2023    Shingles vaccine (1 of 2) Never done    Lipids  06/22/2024             No data to display                 Failed to redirect to the Timeline version of the [employer] SmartLink.    Failed to redirect to the Timeline version of the [employer] SmartLink.

## 2024-07-26 NOTE — PROGRESS NOTES
[facility]  [address] [place] [address]  Phone: [phone]  Fax: [phone]        Chief Complaint   Patient presents with    6 Month Follow-Up     Hypertension     Labs Only     He is a 49 y.o. male who presents for HTN follow up.    Presents for HTN follow up.  Taking medications as prescribed and reports no side effects.  Denies chest pain, headache, visual disturbances.  Does not check BP at home.    Prior to Visit Medications    Medication Sig Taking? Authorizing Provider   Multiple Vitamin (MULTIVITAMIN ADULT PO) Take by mouth Yes ProviderPretty MD   atorvastatin (LIPITOR) 20 MG tablet Take 1 tablet by mouth daily Yes Connor Quinonez MD   hydroCHLOROthiazide 12.5 MG capsule Take 1 capsule by mouth every morning Yes Connor Quinonez MD   lisinopril (PRINIVIL;ZESTRIL) 40 MG tablet Take 1 tablet by mouth daily Yes Connor Quinonez MD       Allergies   Allergen Reactions    Mixed Ragweed Itching    Molds & Smuts Itching         Reviewed PmHx, RxHx, FmHx, SocHx, AllgHx and updated and dated in the chart.      Objective:     Vitals:    07/26/24 0801   BP: 116/78   Site: Left Upper Arm   Pulse: 70   Resp: 16   Temp: 99 °F (37.2 °C)   SpO2: 96%   Weight: (!) 142.5 kg (314 lb 3.2 oz)   Height: 1.88 m (6' 2\")     Physical Examination:    Physical Exam  Vitals and nursing note reviewed.   Constitutional:       General: He is not in acute distress.     Appearance: Normal appearance. He is obese.   HENT:      Head: Normocephalic and atraumatic.   Cardiovascular:      Rate and Rhythm: Normal rate and regular rhythm.      Pulses: Normal pulses.      Heart sounds: Normal heart sounds.   Pulmonary:      Effort: Pulmonary effort is normal.      Breath sounds: Normal breath sounds.   Musculoskeletal:      Cervical back: Normal range of motion.   Lymphadenopathy:      Cervical: No cervical adenopathy.   Skin:     General: Skin is warm and dry.      Findings: No rash.   Neurological:

## 2024-10-21 DIAGNOSIS — I10 ESSENTIAL (PRIMARY) HYPERTENSION: ICD-10-CM

## 2024-10-21 RX ORDER — LISINOPRIL 40 MG/1
40 TABLET ORAL DAILY
Qty: 90 TABLET | Refills: 1 | Status: SHIPPED | OUTPATIENT
Start: 2024-10-21

## 2024-10-21 RX ORDER — HYDROCHLOROTHIAZIDE 12.5 MG/1
12.5 CAPSULE ORAL EVERY MORNING
Qty: 90 CAPSULE | Refills: 1 | Status: SHIPPED | OUTPATIENT
Start: 2024-10-21

## 2025-01-04 DIAGNOSIS — E78.00 PURE HYPERCHOLESTEROLEMIA, UNSPECIFIED: ICD-10-CM

## 2025-01-06 RX ORDER — ATORVASTATIN CALCIUM 20 MG/1
20 TABLET, FILM COATED ORAL DAILY
Qty: 90 TABLET | Refills: 1 | Status: SHIPPED | OUTPATIENT
Start: 2025-01-06

## 2025-01-26 SDOH — ECONOMIC STABILITY: INCOME INSECURITY: IN THE LAST 12 MONTHS, WAS THERE A TIME WHEN YOU WERE NOT ABLE TO PAY THE MORTGAGE OR RENT ON TIME?: NO

## 2025-01-26 SDOH — ECONOMIC STABILITY: FOOD INSECURITY: WITHIN THE PAST 12 MONTHS, YOU WORRIED THAT YOUR FOOD WOULD RUN OUT BEFORE YOU GOT MONEY TO BUY MORE.: NEVER TRUE

## 2025-01-26 SDOH — ECONOMIC STABILITY: TRANSPORTATION INSECURITY
IN THE PAST 12 MONTHS, HAS THE LACK OF TRANSPORTATION KEPT YOU FROM MEDICAL APPOINTMENTS OR FROM GETTING MEDICATIONS?: NO

## 2025-01-26 SDOH — ECONOMIC STABILITY: FOOD INSECURITY: WITHIN THE PAST 12 MONTHS, THE FOOD YOU BOUGHT JUST DIDN'T LAST AND YOU DIDN'T HAVE MONEY TO GET MORE.: NEVER TRUE

## 2025-01-27 ENCOUNTER — OFFICE VISIT (OUTPATIENT)
Age: 50
End: 2025-01-27
Payer: COMMERCIAL

## 2025-01-27 VITALS
DIASTOLIC BLOOD PRESSURE: 86 MMHG | HEIGHT: 74 IN | HEART RATE: 57 BPM | WEIGHT: 314 LBS | BODY MASS INDEX: 40.3 KG/M2 | OXYGEN SATURATION: 99 % | RESPIRATION RATE: 16 BRPM | TEMPERATURE: 98 F | SYSTOLIC BLOOD PRESSURE: 130 MMHG

## 2025-01-27 DIAGNOSIS — E66.01 CLASS 3 SEVERE OBESITY DUE TO EXCESS CALORIES WITHOUT SERIOUS COMORBIDITY WITH BODY MASS INDEX (BMI) OF 40.0 TO 44.9 IN ADULT: ICD-10-CM

## 2025-01-27 DIAGNOSIS — F33.1 MAJOR DEPRESSIVE DISORDER, RECURRENT, MODERATE (HCC): ICD-10-CM

## 2025-01-27 DIAGNOSIS — E66.813 CLASS 3 SEVERE OBESITY DUE TO EXCESS CALORIES WITHOUT SERIOUS COMORBIDITY WITH BODY MASS INDEX (BMI) OF 40.0 TO 44.9 IN ADULT: ICD-10-CM

## 2025-01-27 DIAGNOSIS — E78.5 HYPERLIPIDEMIA, UNSPECIFIED HYPERLIPIDEMIA TYPE: ICD-10-CM

## 2025-01-27 DIAGNOSIS — R51.9 ACUTE NONINTRACTABLE HEADACHE, UNSPECIFIED HEADACHE TYPE: ICD-10-CM

## 2025-01-27 DIAGNOSIS — N52.2 DRUG-INDUCED ERECTILE DYSFUNCTION: ICD-10-CM

## 2025-01-27 DIAGNOSIS — I10 ESSENTIAL (PRIMARY) HYPERTENSION: Primary | ICD-10-CM

## 2025-01-27 PROCEDURE — 99214 OFFICE O/P EST MOD 30 MIN: CPT | Performed by: FAMILY MEDICINE

## 2025-01-27 PROCEDURE — 3075F SYST BP GE 130 - 139MM HG: CPT | Performed by: FAMILY MEDICINE

## 2025-01-27 PROCEDURE — 3079F DIAST BP 80-89 MM HG: CPT | Performed by: FAMILY MEDICINE

## 2025-01-27 ASSESSMENT — PATIENT HEALTH QUESTIONNAIRE - PHQ9
SUM OF ALL RESPONSES TO PHQ QUESTIONS 1-9: 0
SUM OF ALL RESPONSES TO PHQ QUESTIONS 1-9: 0
SUM OF ALL RESPONSES TO PHQ9 QUESTIONS 1 & 2: 0
1. LITTLE INTEREST OR PLEASURE IN DOING THINGS: NOT AT ALL
SUM OF ALL RESPONSES TO PHQ QUESTIONS 1-9: 0
SUM OF ALL RESPONSES TO PHQ QUESTIONS 1-9: 0
2. FEELING DOWN, DEPRESSED OR HOPELESS: NOT AT ALL

## 2025-01-27 NOTE — PROGRESS NOTES
Identified pt with two pt identifiers(name and )    Chief Complaint   Patient presents with    6 Month Follow-Up    Headache     Daughter is sick and this was her first symptom.        Health Maintenance Due   Topic    Hepatitis B vaccine (1 of 3 - 19+ 3-dose series)    Shingles vaccine (1 of 2)    Flu vaccine (1)    COVID-19 Vaccine ( season)       Wt Readings from Last 3 Encounters:   25 (!) 142.4 kg (314 lb)   24 (!) 142.5 kg (314 lb 3.2 oz)   24 (!) 141.6 kg (312 lb 3.2 oz)     Temp Readings from Last 3 Encounters:   25 98 °F (36.7 °C) (Temporal)   24 99 °F (37.2 °C)   24 97.8 °F (36.6 °C) (Temporal)     BP Readings from Last 3 Encounters:   25 130/86   24 116/78   24 128/70     Pulse Readings from Last 3 Encounters:   25 57   24 70   24 78           Depression Screening:  :         2025     8:37 AM 2024     8:02 AM 2024     2:01 PM 2024     2:00 PM 2023    10:10 AM 3/9/2023     1:00 PM 2022    10:00 AM   PHQ-9 Questionaire   Little interest or pleasure in doing things 0 0 0 0 0 0 0   Feeling down, depressed, or hopeless 0 0 0 0 0 0 0   Trouble falling or staying asleep, or sleeping too much   0       Feeling tired or having little energy   0       Poor appetite or overeating   0       Feeling bad about yourself - or that you are a failure or have let yourself or your family down   0       Trouble concentrating on things, such as reading the newspaper or watching television   0       Moving or speaking so slowly that other people could have noticed. Or the opposite - being so fidgety or restless that you have been moving around a lot more than usual   0       Thoughts that you would be better off dead, or of hurting yourself in some way   0       PHQ-9 Total Score 0 0 0 0 0 0 0   If you checked off any problems, how difficult have these problems made it for you to do your work, take care of things 
as documented below for continued follow up.  More specific details regarding the plan for certain diagnoses immediately follows.    BP is at goal. Will continue current regimen.  Discussed erectile issues with him.  He would like to keep an eye on things for now. He is trying to lose weight to ultimately come off of medications. Declines trial of viagra.    He can use OTC NSAID for headache. He could be developing flu-like illness. He will keep an eye on things and let me know if anything worsening.    Return in about 6 months (around 7/27/2025) for HTN + fasting labs.     I have discussed the diagnosis with the patient and the intended treatment plan as seen in the above orders. The patient has received an after-visit summary and questions were answered concerning future plans. Asked to return should symptoms worsen or not improve with treatment. Any pending labs and studies will be relayed to patient when they become available.     Pt verbalizes understanding of plan of care and denies further questions or concerns at this time.     Connor Quinonez MD, FAAFP

## 2025-04-21 DIAGNOSIS — I10 ESSENTIAL (PRIMARY) HYPERTENSION: ICD-10-CM

## 2025-04-21 RX ORDER — LISINOPRIL 40 MG/1
40 TABLET ORAL DAILY
Qty: 90 TABLET | Refills: 1 | Status: SHIPPED | OUTPATIENT
Start: 2025-04-21

## 2025-04-21 RX ORDER — HYDROCHLOROTHIAZIDE 12.5 MG/1
12.5 CAPSULE ORAL EVERY MORNING
Qty: 90 CAPSULE | Refills: 1 | Status: SHIPPED | OUTPATIENT
Start: 2025-04-21

## 2025-07-21 ENCOUNTER — OFFICE VISIT (OUTPATIENT)
Age: 50
End: 2025-07-21
Payer: COMMERCIAL

## 2025-07-21 ENCOUNTER — TELEPHONE (OUTPATIENT)
Age: 50
End: 2025-07-21

## 2025-07-21 ENCOUNTER — LAB (OUTPATIENT)
Age: 50
End: 2025-07-21

## 2025-07-21 VITALS
WEIGHT: 305 LBS | SYSTOLIC BLOOD PRESSURE: 136 MMHG | OXYGEN SATURATION: 97 % | HEART RATE: 57 BPM | TEMPERATURE: 98.2 F | BODY MASS INDEX: 39.14 KG/M2 | DIASTOLIC BLOOD PRESSURE: 82 MMHG | HEIGHT: 74 IN | RESPIRATION RATE: 16 BRPM

## 2025-07-21 DIAGNOSIS — Z12.5 SCREENING FOR MALIGNANT NEOPLASM OF PROSTATE: ICD-10-CM

## 2025-07-21 DIAGNOSIS — I10 ESSENTIAL (PRIMARY) HYPERTENSION: ICD-10-CM

## 2025-07-21 DIAGNOSIS — K40.90 RIGHT INGUINAL HERNIA: ICD-10-CM

## 2025-07-21 DIAGNOSIS — E66.812 CLASS 2 OBESITY DUE TO EXCESS CALORIES WITHOUT SERIOUS COMORBIDITY WITH BODY MASS INDEX (BMI) OF 39.0 TO 39.9 IN ADULT: ICD-10-CM

## 2025-07-21 DIAGNOSIS — E78.00 PURE HYPERCHOLESTEROLEMIA, UNSPECIFIED: ICD-10-CM

## 2025-07-21 DIAGNOSIS — R73.02 IMPAIRED GLUCOSE TOLERANCE: ICD-10-CM

## 2025-07-21 DIAGNOSIS — E66.09 CLASS 2 OBESITY DUE TO EXCESS CALORIES WITHOUT SERIOUS COMORBIDITY WITH BODY MASS INDEX (BMI) OF 39.0 TO 39.9 IN ADULT: ICD-10-CM

## 2025-07-21 DIAGNOSIS — Z00.00 VISIT FOR WELL MAN HEALTH CHECK: Primary | ICD-10-CM

## 2025-07-21 PROCEDURE — 3079F DIAST BP 80-89 MM HG: CPT | Performed by: FAMILY MEDICINE

## 2025-07-21 PROCEDURE — 99396 PREV VISIT EST AGE 40-64: CPT | Performed by: FAMILY MEDICINE

## 2025-07-21 PROCEDURE — 3075F SYST BP GE 130 - 139MM HG: CPT | Performed by: FAMILY MEDICINE

## 2025-07-21 PROCEDURE — 99214 OFFICE O/P EST MOD 30 MIN: CPT | Performed by: FAMILY MEDICINE

## 2025-07-21 RX ORDER — HYDROCHLOROTHIAZIDE 12.5 MG/1
12.5 CAPSULE ORAL EVERY MORNING
Qty: 90 CAPSULE | Refills: 1 | Status: SHIPPED | OUTPATIENT
Start: 2025-07-21

## 2025-07-21 RX ORDER — LISINOPRIL 40 MG/1
40 TABLET ORAL DAILY
Qty: 90 TABLET | Refills: 1 | Status: SHIPPED | OUTPATIENT
Start: 2025-07-21

## 2025-07-21 RX ORDER — ATORVASTATIN CALCIUM 20 MG/1
20 TABLET, FILM COATED ORAL DAILY
Qty: 90 TABLET | Refills: 1 | Status: SHIPPED | OUTPATIENT
Start: 2025-07-21

## 2025-07-21 ASSESSMENT — PATIENT HEALTH QUESTIONNAIRE - PHQ9
SUM OF ALL RESPONSES TO PHQ QUESTIONS 1-9: 0
2. FEELING DOWN, DEPRESSED OR HOPELESS: NOT AT ALL
SUM OF ALL RESPONSES TO PHQ QUESTIONS 1-9: 0
SUM OF ALL RESPONSES TO PHQ QUESTIONS 1-9: 0
1. LITTLE INTEREST OR PLEASURE IN DOING THINGS: NOT AT ALL
SUM OF ALL RESPONSES TO PHQ QUESTIONS 1-9: 0

## 2025-07-21 NOTE — TELEPHONE ENCOUNTER
Outbound call to patient in regards to a referral the office received. LVM to call office back if still interested in scheduling with a general surgeon. Office number provided.

## 2025-07-21 NOTE — PROGRESS NOTES
[facility]  [address] [place] [address]  Phone: [phone]  Fax: [phone]    This note was completed with the assistance of a medical student during a learning experience at [facility].  I personally performed all components of the physical examination and medical decision making. I have verified and agree with medical student’s documentation for this encounter.  I have made edits where appropriate.    Connor Quinonez MD        Chief Complaint   Patient presents with    Annual Exam     He is a 50 y.o. male who presents for annual physical.    Reports taking HTN medications as prescribed. Denies any new side effects. He reports \"spot checking\" his blood pressure at home. Reports reading of 120s/low 80s at home, today's blood pressure in office is surprising to him.     Reports compliance with hyperlipidemia medications.    He reports a hernia in right groin. He previously had a right groin hernia in 2022 which was treated with surgery. He feels like it is in the in same region as the last one but a slightly different spot. He wants a referral and guidance. The last time he went to a specialist they wanted imaging done and he wants to know if he should have imaging done before.  He thinks it is more advanced than his last hernia, more tissue involved. States it is uncomfortable if it is \"bulged out\", 2-3/10 pain. He states it is more annoying than uncomfortable. Discomfort resolves if he replaces the tissue back.     Prior to Visit Medications    Medication Sig Taking? Authorizing Provider   atorvastatin (LIPITOR) 20 MG tablet Take 1 tablet by mouth daily Yes Connor Quinonez MD   hydroCHLOROthiazide 12.5 MG capsule Take 1 capsule by mouth every morning Yes Connor Quinonez MD   lisinopril (PRINIVIL;ZESTRIL) 40 MG tablet Take 1 tablet by mouth daily Yes Connor Quinonez MD   Multiple Vitamin (MULTIVITAMIN ADULT PO) Take by mouth Yes ProviderPretty MD

## 2025-07-21 NOTE — PROGRESS NOTES
Identified pt with two pt identifiers(name and )    Chief Complaint   Patient presents with    Annual Exam        Health Maintenance Due   Topic    Hepatitis B vaccine (1 of 3 - 19+ 3-dose series)    Shingles vaccine (1 of 2)    Pneumococcal 50+ years Vaccine (1 of 1 - PCV)    COVID-19 Vaccine ( season)    Colorectal Cancer Screen     Lipids        Wt Readings from Last 3 Encounters:   25 (!) 138.3 kg (305 lb)   25 (!) 142.4 kg (314 lb)   24 (!) 142.5 kg (314 lb 3.2 oz)     Temp Readings from Last 3 Encounters:   25 98.2 °F (36.8 °C) (Temporal)   25 98 °F (36.7 °C) (Temporal)   24 99 °F (37.2 °C)     BP Readings from Last 3 Encounters:   25 136/82   25 130/86   24 116/78     Pulse Readings from Last 3 Encounters:   25 57   25 57   24 70           Depression Screening:  :         2025     8:08 AM 2025     8:37 AM 2024     8:02 AM 2024     2:01 PM 2024     2:00 PM 2023    10:10 AM 3/9/2023     1:00 PM   PHQ-9 Questionaire   Little interest or pleasure in doing things 0 0 0 0 0 0 0   Feeling down, depressed, or hopeless 0 0 0 0 0 0 0   Trouble falling or staying asleep, or sleeping too much    0      Feeling tired or having little energy    0      Poor appetite or overeating    0      Feeling bad about yourself - or that you are a failure or have let yourself or your family down    0      Trouble concentrating on things, such as reading the newspaper or watching television    0      Moving or speaking so slowly that other people could have noticed. Or the opposite - being so fidgety or restless that you have been moving around a lot more than usual    0      Thoughts that you would be better off dead, or of hurting yourself in some way    0      PHQ-9 Total Score 0 0 0 0 0 0 0   If you checked off any problems, how difficult have these problems made it for you to do your work, take care of things at home,

## 2025-07-21 NOTE — PROGRESS NOTES
[facility]  [address] [place] [address]  Phone: [phone]  Fax: [phone]    This note was completed with the assistance of a medical student during a learning experience at [facility].  I personally performed all components of the physical examination and medical decision making. I have verified and agree with medical student’s documentation for this encounter.  I have made edits where appropriate.    Connor Quinonez MD        Chief Complaint   Patient presents with    Annual Exam     He is a 50 y.o. male who presents for annual physical.    Reports taking HTN medications as prescribed. Denies any new side effects. He reports \"spot checking\" his blood pressure at home. Reports reading of 120s/low 80s at home, today's blood pressure in office is surprising to him.     Reports compliance with hyperlipidemia medications.    He reports a hernia in right groin. He previously had a right groin hernia in 2022 which was treated with surgery. He feels like it is in the in same region as the last one but a slightly different spot. He wants a referral and guidance. The last time he went to a specialist they wanted imaging done and he wants to know if he should have imaging done before.  He thinks it is more advanced than his last hernia, more tissue involved. States it is uncomfortable if it is \"bulged out\", 2-3/10 pain. He states it is more annoying than uncomfortable. Discomfort resolves if he replaces the tissue back.     Prior to Visit Medications    Medication Sig Taking? Authorizing Provider   atorvastatin (LIPITOR) 20 MG tablet Take 1 tablet by mouth daily Yes Connor Quinonez MD   hydroCHLOROthiazide 12.5 MG capsule Take 1 capsule by mouth every morning Yes Connor Quinonez MD   lisinopril (PRINIVIL;ZESTRIL) 40 MG tablet Take 1 tablet by mouth daily Yes Connor Quinonez MD   Multiple Vitamin (MULTIVITAMIN ADULT PO) Take by mouth Yes ProviderPretty MD

## 2025-07-22 LAB
ALBUMIN SERPL-MCNC: 4.2 G/DL (ref 3.5–5)
ALBUMIN/GLOB SERPL: 1.4 (ref 1.1–2.2)
ALP SERPL-CCNC: 35 U/L (ref 45–117)
ALT SERPL-CCNC: 42 U/L (ref 12–78)
ANION GAP SERPL CALC-SCNC: 5 MMOL/L (ref 2–12)
AST SERPL-CCNC: 23 U/L (ref 15–37)
BILIRUB SERPL-MCNC: 0.8 MG/DL (ref 0.2–1)
BUN SERPL-MCNC: 21 MG/DL (ref 6–20)
BUN/CREAT SERPL: 18 (ref 12–20)
CALCIUM SERPL-MCNC: 9.2 MG/DL (ref 8.5–10.1)
CHLORIDE SERPL-SCNC: 102 MMOL/L (ref 97–108)
CHOLEST SERPL-MCNC: 154 MG/DL
CO2 SERPL-SCNC: 30 MMOL/L (ref 21–32)
CREAT SERPL-MCNC: 1.15 MG/DL (ref 0.7–1.3)
EST. AVERAGE GLUCOSE BLD GHB EST-MCNC: 114 MG/DL
GLOBULIN SER CALC-MCNC: 2.9 G/DL (ref 2–4)
GLUCOSE SERPL-MCNC: 101 MG/DL (ref 65–100)
HBA1C MFR BLD: 5.6 % (ref 4–5.6)
HDLC SERPL-MCNC: 52 MG/DL
HDLC SERPL: 3 (ref 0–5)
LDLC SERPL CALC-MCNC: 89.6 MG/DL (ref 0–100)
POTASSIUM SERPL-SCNC: 4.7 MMOL/L (ref 3.5–5.1)
PROT SERPL-MCNC: 7.1 G/DL (ref 6.4–8.2)
PSA SERPL-MCNC: 0.9 NG/ML (ref 0.01–4)
SODIUM SERPL-SCNC: 137 MMOL/L (ref 136–145)
TRIGL SERPL-MCNC: 62 MG/DL
VLDLC SERPL CALC-MCNC: 12.4 MG/DL

## 2025-07-28 ENCOUNTER — TELEPHONE (OUTPATIENT)
Age: 50
End: 2025-07-28

## 2025-07-28 NOTE — TELEPHONE ENCOUNTER
Outbound call to patient in regards to a referral the office received. LVM to call office if still interested in scheduling with a general surgeon. Office number was provided.

## 2025-08-08 ENCOUNTER — OFFICE VISIT (OUTPATIENT)
Age: 50
End: 2025-08-08
Payer: COMMERCIAL

## 2025-08-08 VITALS
HEIGHT: 74 IN | HEART RATE: 52 BPM | OXYGEN SATURATION: 97 % | BODY MASS INDEX: 39.09 KG/M2 | DIASTOLIC BLOOD PRESSURE: 70 MMHG | SYSTOLIC BLOOD PRESSURE: 117 MMHG | TEMPERATURE: 98.1 F | RESPIRATION RATE: 16 BRPM | WEIGHT: 304.6 LBS

## 2025-08-08 DIAGNOSIS — K40.91 UNILATERAL RECURRENT INGUINAL HERNIA WITHOUT OBSTRUCTION OR GANGRENE: Primary | ICD-10-CM

## 2025-08-08 PROCEDURE — 99204 OFFICE O/P NEW MOD 45 MIN: CPT | Performed by: SURGERY

## 2025-08-08 RX ORDER — SODIUM CHLORIDE 9 MG/ML
INJECTION, SOLUTION INTRAVENOUS PRN
OUTPATIENT
Start: 2025-08-08

## 2025-08-08 RX ORDER — SODIUM CHLORIDE 0.9 % (FLUSH) 0.9 %
5-40 SYRINGE (ML) INJECTION PRN
OUTPATIENT
Start: 2025-08-08

## 2025-08-08 RX ORDER — SODIUM CHLORIDE 0.9 % (FLUSH) 0.9 %
5-40 SYRINGE (ML) INJECTION EVERY 12 HOURS SCHEDULED
OUTPATIENT
Start: 2025-08-08

## 2025-08-08 ASSESSMENT — PATIENT HEALTH QUESTIONNAIRE - PHQ9
SUM OF ALL RESPONSES TO PHQ QUESTIONS 1-9: 0
SUM OF ALL RESPONSES TO PHQ QUESTIONS 1-9: 0
2. FEELING DOWN, DEPRESSED OR HOPELESS: NOT AT ALL
SUM OF ALL RESPONSES TO PHQ QUESTIONS 1-9: 0
SUM OF ALL RESPONSES TO PHQ QUESTIONS 1-9: 0
1. LITTLE INTEREST OR PLEASURE IN DOING THINGS: NOT AT ALL

## 2025-08-11 ENCOUNTER — TELEPHONE (OUTPATIENT)
Age: 50
End: 2025-08-11